# Patient Record
Sex: FEMALE | Race: WHITE | NOT HISPANIC OR LATINO
[De-identification: names, ages, dates, MRNs, and addresses within clinical notes are randomized per-mention and may not be internally consistent; named-entity substitution may affect disease eponyms.]

---

## 2021-08-06 PROBLEM — Z00.00 ENCOUNTER FOR PREVENTIVE HEALTH EXAMINATION: Status: ACTIVE | Noted: 2021-08-06

## 2021-08-09 ENCOUNTER — APPOINTMENT (OUTPATIENT)
Dept: NEUROSURGERY | Facility: CLINIC | Age: 66
End: 2021-08-09
Payer: MEDICARE

## 2021-08-09 VITALS
WEIGHT: 158 LBS | SYSTOLIC BLOOD PRESSURE: 148 MMHG | HEIGHT: 65 IN | HEART RATE: 72 BPM | TEMPERATURE: 97.2 F | DIASTOLIC BLOOD PRESSURE: 85 MMHG | BODY MASS INDEX: 26.33 KG/M2

## 2021-08-09 PROCEDURE — 99205 OFFICE O/P NEW HI 60 MIN: CPT

## 2021-08-09 NOTE — HISTORY OF PRESENT ILLNESS
[de-identified] : 64 yo F with pmhx of Aortic stenosis, HLD, HTN, Depression, on baby ASA, referred by self, presents to neurosurgery clinic due to long standing back pain. She had this back pain for many years and has tried physical therapy and pain management with ESIs in the past with no relief of pain.  She had a recent MRI L spine performed on 8/5/21 which showed multilevel multifactorial central canal stenosis most severe at L4-5 followed by L5-S1 and L3-4. She describes the back pain as constant throughout the day and is mostly axial in nature however it intermittently radiates down left buttox to left lateral mid thigh. She denies numbness, weakness of extremities and urinary/fecal incontinence. She presents to our clinic for neurosurgical consultation in relieving long standing back pain. \par \par PSH: 2 knee replacements\par Allergies: NKDA\par FHx: Heart disease in mother's side. Paternal: grandmother had BA\par SH: Works part time. Lives with family and currently caring for daughter with disability. Denies alcohol, recreational drug use, and tobacco use.\par Meds: Carvedilol, Telmisartan, Duloxetine, Simvastatin, Trazadone, Famotidine, Meloxicam, ASA

## 2021-08-09 NOTE — END OF VISIT
[FreeTextEntry3] : I have seen the patient and reviewed the case together with PA and I agree with the final recommendations and plan of care.\par \par Sanjeev Keane MD\par Neurosurgery\par \par  [Time Spent: ___ minutes] : I have spent [unfilled] minutes of time on the encounter. [>50% of the face to face encounter time was spent on counseling and/or coordination of care for ___] : Greater than 50% of the face to face encounter time was spent on counseling and/or coordination of care for [unfilled]

## 2021-08-09 NOTE — DATA REVIEWED
[de-identified] : 8/5/2021 MRI L Spine: TECHNIQUE:MRI lumbosacral spine 1.5 Isabel magnet \par \par  FINDINGS: \par \par  The paraspinal musculature including the psoas muscle, multifidus muscles, and \par  immediate para axial soft tissues appear within range of normal without \par  evidence of mass or collection. \par \par  There is a maintenance of the normal lumbar lordosis. There is fairly uniform \par  marrow signal on all sequences. \par \par  The spinal canal is patent without detectable intradural or extradural mass or \par  collection. \par \par  At L5-S1 \par  There is loss of intervertebral disc space height. Multifactorial severe \par  central canal stenosis is present probably secondary to bony overgrowth from \par  adjacent facet arthropathy with there is severe degenerative change and partly \par  secondary to broad-based disc bulging. There is moderately severe left \par  foraminal stenosis and mild to moderate right foraminal stenosis. \par \par  At L4-L5 \par  Anterior subluxation of L4 with respect to L5 measures 3 to 4 mm. There is \par  circumferential disc bulging partially secondary to uncovering of the \par  intervertebral disc which contributes to multifactorial severe central canal \par  stenosis. There is pronounced bony overgrowth associated with facet \par  arthropathy. Mild to moderate right foraminal stenosis and mild left foraminal \par  stenosis is present. \par \par  At L3-L4 \par  There is circumferential disc bulging and bony overgrowth associated with \par  bilateral facet arthropathy which results in moderate to severe central canal \par  stenosis. There is mild bilateral foraminal narrowing. \par \par  At L2-L3 \par  Subtle retrolisthesis of L2 with respect to L3 on the order of 1 to 2 mm is \par  present. There is broad-based disc osteophyte complex which results in \par  moderately severe central canal stenosis. \par \par  At L1-L2 \par  The disc appears intact. There is no significant central or foraminal \par  stenosis. \par \par \par \par  IMPRESSION: \par \par  Multilevel multifactorial central canal stenosis, most severe at L4-L5 \par  followed by L5-S1 and L3-L4. \par \par  Other pertinent findings described above

## 2021-08-09 NOTE — ASSESSMENT
[FreeTextEntry1] : I have discussed the natural history and treatment options for neurogenic claudication due to lumbar spinal stenosis with the patient. I explained the indications for observation, conservative management, medical management, physical therapy, pain management approaches and surgery. I explained the different types and surgical approaches including anterior and posterior approaches as well as decompression only procedures and instrumented fusions. I discussed the risks, benefits, possible complications and expected outcome related to each treatment option. The risks of surgery were discussed in detail including but not limited to postoperative infection at the surgical site, hospital acquired pneumonia, hospital acquired urinary tract infection, postoperative meningitis, wound dehiscence, CSF leak, stroke (ischemic and hemorrhagic), postoperative seizures, worsening motor function due to spinal cord or nerve injury, postoperative visual deficit which could be permanent (blindness) when surgery is performed in a prone position, cardiovascular complications (MI, PE, DVT) and I also explained that some of these complications could lead to sepsis, coma or even death.\par \par In the end, I recommend that she is a surgical candidate. I recommend she undergo a L3-4 laminectomy with or without fusion. To assess if would need fusion, she would need to get flex/ex L spine as well as CT Lumbar spine. Patient is in agreement with surgery and preop paperwork was provided to her. Due to her history of aortic stenosis, she would need not only a medical clearance from PCP but also a cardiology clearance. She will follow up with us in clinic in two weeks. \par \par The patient understands the plan of care and is in agreement.  All questions answered to patient satisfaction.\par \par \par

## 2021-08-09 NOTE — PHYSICAL EXAM
[General Appearance - Alert] : alert [General Appearance - In No Acute Distress] : in no acute distress [Oriented To Time, Place, And Person] : oriented to person, place, and time [Impaired Insight] : insight and judgment were intact [Affect] : the affect was normal [Person] : oriented to person [Place] : oriented to place [Time] : oriented to time [Short Term Intact] : short term memory intact [Remote Intact] : remote memory intact [Span Intact] : the attention span was normal [Concentration Intact] : normal concentrating ability [Fluency] : fluency intact [Comprehension] : comprehension intact [Current Events] : adequate knowledge of current events [Past History] : adequate knowledge of personal past history [Vocabulary] : adequate range of vocabulary [Cranial Nerves Optic (II)] : visual acuity intact bilaterally,  pupils equal round and reactive to light [Cranial Nerves Oculomotor (III)] : extraocular motion intact [Cranial Nerves Trigeminal (V)] : facial sensation intact symmetrically [Cranial Nerves Facial (VII)] : face symmetrical [Cranial Nerves Vestibulocochlear (VIII)] : hearing was intact bilaterally [Cranial Nerves Glossopharyngeal (IX)] : tongue and palate midline [Cranial Nerves Accessory (XI - Cranial And Spinal)] : head turning and shoulder shrug symmetric [Cranial Nerves Hypoglossal (XII)] : there was no tongue deviation with protrusion [Motor Tone] : muscle tone was normal in all four extremities [Motor Strength] : muscle strength was normal in all four extremities [No Muscle Atrophy] : normal bulk in all four extremities [Sensation Tactile Decrease] : light touch was intact [Balance] : balance was intact [2+] : Patella left 2+ [Past-pointing] : there was no past-pointing [Tremor] : no tremor present [FreeTextEntry8] : steady however limps while walking

## 2021-08-23 ENCOUNTER — APPOINTMENT (OUTPATIENT)
Dept: NEUROSURGERY | Facility: CLINIC | Age: 66
End: 2021-08-23
Payer: MEDICARE

## 2021-08-23 VITALS
TEMPERATURE: 98.6 F | DIASTOLIC BLOOD PRESSURE: 94 MMHG | HEART RATE: 71 BPM | HEIGHT: 65 IN | SYSTOLIC BLOOD PRESSURE: 163 MMHG | WEIGHT: 158 LBS | BODY MASS INDEX: 26.33 KG/M2

## 2021-08-23 PROCEDURE — 99215 OFFICE O/P EST HI 40 MIN: CPT

## 2021-08-23 NOTE — REASON FOR VISIT
[FreeTextEntry1] : 8/23/2021: Mr. Richardson presents today for follow up of Flex/ext Lumbar Xray and CT L spine from previous visit ~2 weeks ago regarding her long standing back pain. We recommended last time that she is a surgical candidate and would need further imaging for surgical planning. She had her Flex/ext L spine xray performed which showed no instability. She also had her CT L spine with full report listed below. She had questions regarding surgical approach and recovery period. \par \par 8/09/2021: 64 yo F with pmhx of Aortic stenosis, HLD, HTN, Depression, on baby ASA, referred by self, presents to neurosurgery clinic due to long standing back pain. She had this back pain for many years and has tried physical therapy and pain management with ESIs in the past with no relief of pain. She had a recent MRI L spine performed on 8/5/21 which showed multilevel multifactorial central canal stenosis most severe at L4-5 followed by L5-S1 and L3-4. She describes the back pain as constant throughout the day and is mostly axial in nature however it intermittently radiates down left buttox to left lateral mid thigh. She denies numbness, weakness of extremities and urinary/fecal incontinence. She presents to our clinic for neurosurgical consultation in relieving long standing back pain.

## 2021-08-23 NOTE — PHYSICAL EXAM
[FreeTextEntry1] : Constitutional: alert and in no acute distress. \par Psychiatric: oriented to person, place, and time, insight and judgment were intact and the affect was normal. \par \par Orientation: oriented to person, oriented to place and oriented to time. \par Memory: short term memory intact and remote memory intact. \par Attention: the attention span was normal and normal concentrating ability. \par Language: fluency intact and comprehension intact. \par Fund of knowledge: displays adequate knowledge of current events, adequate knowledge of personal past history and adequate range of vocabulary. \par Cranial Nerves: visual acuity intact bilaterally, pupils equal round and reactive to light, extraocular motion intact, facial sensation intact symmetrically, face symmetrical, hearing was intact bilaterally, tongue and palate midline, head turning and shoulder shrug symmetric and there was no tongue deviation with protrusion. \par Motor: muscle tone was normal in all four extremities, muscle strength was normal in all four extremities and normal bulk in all four extremities. \par Sensory exam: light touch was intact. \par Coordination:. balance was intact. there was no past-pointing. no tremor present. steady however limps while walking. \par Deep tendon reflexes: \par Biceps right 2+. Biceps left 2+.  \par Triceps right 2+. Triceps left 2+.  \par Patella right 2+. Patella left 2+.  \par

## 2021-08-23 NOTE — DATA REVIEWED
[de-identified] : CT L spine 8/2021: Multilevel spondylitic changes of the lumbar spine as detailed above notable for moderate to severe\par canal stenosis at L2-L3, L3-L4 and L4-L5 L5-S1, moderate to severe bilateral foraminal stenosis at \par L2-L3, L4-L5, L5-S1 involving the exiting L2, L4, fibrous, respectively and moderate to severe \par bilateral lateral recess stenosis at L2-L3, L4-L5, L5-S1, involving the descending L3, L5, S1 nerve \par roots, respectively. \par \par \par  Overall no significant change in degree of spondylitic disease when compared with August 5, 2021 \par lumbar spine MRI. \par

## 2021-08-23 NOTE — ASSESSMENT
[FreeTextEntry1] : Spinal stenosis of lumbar region with neurogenic claudication (724.03) (M48.062)\par Spondylolisthesis at L4-L5 level (756.12) (M43.16)\par \par I have discussed the natural history and treatment options for lumbar spondylolisthesis and neurogenic claudication due to lumbar spinal stenosis with the patient. I explained the indications for observation, conservative management, medical management, physical therapy, pain management approaches and surgery. I explained the different types and surgical approaches including anterior and posterior approaches as well as decompression only procedures and instrumented fusions. I discussed the risks, benefits, possible complications and expected outcome related to each treatment option. The risks of surgery were discussed in detail including but not limited to postoperative infection at the surgical site, hospital acquired pneumonia, hospital acquired urinary tract infection, postoperative meningitis, wound dehiscence, CSF leak, stroke (ischemic and hemorrhagic), postoperative seizures, worsening motor function due to spinal cord or nerve injury, postoperative visual deficit which could be permanent (blindness) when surgery is performed in a prone position, cardiovascular complications (MI, PE, DVT) and I also explained that some of these complications could lead to sepsis, coma or even death.\par \par In the end, I recommend that she is a surgical candidate and likely will book her case for this fall. I recommend she undergo a L4-5 decompression and fusion. In addition, I will also present her case at the [facility] spine conference.\par Preop paperwork was provided to patient. \par \par The patient understands the plan of care and is in agreement. All questions answered to patient satisfaction.\par

## 2021-10-07 ENCOUNTER — APPOINTMENT (OUTPATIENT)
Dept: NEUROSURGERY | Facility: HOSPITAL | Age: 66
End: 2021-10-07

## 2021-11-06 ENCOUNTER — NON-APPOINTMENT (OUTPATIENT)
Age: 66
End: 2021-11-06

## 2021-11-15 ENCOUNTER — RESULT REVIEW (OUTPATIENT)
Age: 66
End: 2021-11-15

## 2021-11-15 ENCOUNTER — APPOINTMENT (OUTPATIENT)
Dept: HEMATOLOGY ONCOLOGY | Facility: CLINIC | Age: 66
End: 2021-11-15
Payer: MEDICARE

## 2021-11-15 ENCOUNTER — NON-APPOINTMENT (OUTPATIENT)
Age: 66
End: 2021-11-15

## 2021-11-15 VITALS
TEMPERATURE: 98.6 F | OXYGEN SATURATION: 97 % | WEIGHT: 158 LBS | HEIGHT: 65 IN | HEART RATE: 73 BPM | DIASTOLIC BLOOD PRESSURE: 94 MMHG | SYSTOLIC BLOOD PRESSURE: 163 MMHG | BODY MASS INDEX: 26.33 KG/M2 | RESPIRATION RATE: 16 BRPM

## 2021-11-15 DIAGNOSIS — Z86.79 PERSONAL HISTORY OF OTHER DISEASES OF THE CIRCULATORY SYSTEM: ICD-10-CM

## 2021-11-15 DIAGNOSIS — Z78.9 OTHER SPECIFIED HEALTH STATUS: ICD-10-CM

## 2021-11-15 DIAGNOSIS — Z87.891 PERSONAL HISTORY OF NICOTINE DEPENDENCE: ICD-10-CM

## 2021-11-15 DIAGNOSIS — G43.109 MIGRAINE WITH AURA, NOT INTRACTABLE, W/OUT STATUS MIGRAINOSUS: ICD-10-CM

## 2021-11-15 DIAGNOSIS — Z80.6 FAMILY HISTORY OF LEUKEMIA: ICD-10-CM

## 2021-11-15 DIAGNOSIS — Z80.0 FAMILY HISTORY OF MALIGNANT NEOPLASM OF DIGESTIVE ORGANS: ICD-10-CM

## 2021-11-15 DIAGNOSIS — Z80.1 FAMILY HISTORY OF MALIGNANT NEOPLASM OF TRACHEA, BRONCHUS AND LUNG: ICD-10-CM

## 2021-11-15 DIAGNOSIS — Z80.3 FAMILY HISTORY OF MALIGNANT NEOPLASM OF BREAST: ICD-10-CM

## 2021-11-15 DIAGNOSIS — Z86.59 PERSONAL HISTORY OF OTHER MENTAL AND BEHAVIORAL DISORDERS: ICD-10-CM

## 2021-11-15 PROCEDURE — 36415 COLL VENOUS BLD VENIPUNCTURE: CPT

## 2021-11-15 PROCEDURE — 99205 OFFICE O/P NEW HI 60 MIN: CPT | Mod: 25

## 2021-11-15 RX ORDER — ATORVASTATIN CALCIUM 10 MG/1
10 TABLET, FILM COATED ORAL
Refills: 0 | Status: ACTIVE | COMMUNITY
Start: 2021-11-15

## 2021-11-15 RX ORDER — TRAZODONE HYDROCHLORIDE 100 MG/1
100 TABLET ORAL
Refills: 0 | Status: ACTIVE | COMMUNITY
Start: 2021-11-15

## 2021-11-15 RX ORDER — TELMISARTAN AND HYDROCHLOROTHIAZIDE 40; 12.5 MG/1; MG/1
40-12.5 TABLET ORAL
Refills: 0 | Status: ACTIVE | COMMUNITY
Start: 2021-11-15

## 2021-11-15 RX ORDER — CARVEDILOL 12.5 MG/1
12.5 TABLET, FILM COATED ORAL
Qty: 180 | Refills: 0 | Status: ACTIVE | COMMUNITY
Start: 2021-10-01

## 2021-11-15 RX ORDER — ASPIRIN 81 MG/1
81 TABLET, CHEWABLE ORAL
Refills: 0 | Status: ACTIVE | COMMUNITY
Start: 2021-11-15

## 2021-11-15 RX ORDER — DULOXETINE HYDROCHLORIDE 60 MG/1
60 CAPSULE, DELAYED RELEASE PELLETS ORAL
Refills: 0 | Status: ACTIVE | COMMUNITY
Start: 2021-11-15

## 2021-11-15 RX ORDER — CITALOPRAM 40 MG/1
40 TABLET, FILM COATED ORAL
Refills: 0 | Status: ACTIVE | COMMUNITY
Start: 2021-11-15

## 2021-11-15 RX ORDER — NAPROXEN 500 MG/1
500 TABLET ORAL
Qty: 60 | Refills: 0 | Status: ACTIVE | COMMUNITY
Start: 2021-10-19

## 2021-11-15 RX ORDER — FAMOTIDINE 40 MG/1
40 TABLET, FILM COATED ORAL
Refills: 0 | Status: ACTIVE | COMMUNITY
Start: 2021-11-15

## 2021-11-15 RX ORDER — SIMVASTATIN 10 MG/1
10 TABLET, FILM COATED ORAL
Qty: 90 | Refills: 0 | Status: COMPLETED | COMMUNITY
Start: 2021-04-15

## 2021-11-15 NOTE — REVIEW OF SYSTEMS
[Negative] : Allergic/Immunologic [Fatigue] : fatigue [Anxiety] : anxiety [FreeTextEntry9] : back pain

## 2021-11-15 NOTE — HISTORY OF PRESENT ILLNESS
[de-identified] : Ms. Richardson is a 66 year old woman who presents for initial consultation of prolonged PTT.\par Most recent blood work 2021, 37.1 and 37.8\par She was seen by Dr. Dunaway for medical clearance prior to a L4-L5 laminectomy with L4-L5 fusion due to lumbar spinal stenosis by Dr. Keane\par Patient states that she has been taking aspirin 81mg daily, PO due to aortic stenosis and is on naproxen due to inflammation of her knees. In 2018 she had a knee replacement, 2019, she had a 2nd one.  No issues bleeding during previous surgeries\par Doesn't bleed or bruise.\par \par Age of Menarche: 10 years old, heavy menses at times 5-6 days\par Age of Menopause: 50\par OCP: none\par HRT: none\par \par \par Social: Former smoker\par Drinker: social drinker: rarely\par Illicit Drugs:None

## 2021-11-15 NOTE — ASSESSMENT
[FreeTextEntry1] : #prolonged PTT\par We have reviewed the causes of prolonged PTT to include factor deficiencies of  VIII, IX, or XI, XII, prekallikrein, or HMW kininogen. Deficiencies of XII, prekallikrein or HMW kininogen is not associated with a bleeding diathesis. Anticoagulants such as Heparin, dabigatran, argatroban, direct factor Xa inhibitors can cause prolongation although the patient is not on any blood thinners. Acquired inhibitor of factor VIII, IX, XI, or XII  can also cause prolongation. Lupus anticoagulant can lead to prolongation of PTT however it is more likely to be associated with thrombosis than bleeding.\par \par #back pain - scheduled for a L4-5 decompression and fusion with Dr. Keane\par once work up is back will discuss if she is a bleeding risk and what interventions may be needed\par \par # aortic stenosis on aspirin 81mg daily\par advised to contact cardio/pcp however usually ASA is stopped 7 days prior to surgery\par \par #HTN - continue telmisartan/coreg\par \par #fatigue \par check iron, B12, TSH, Vit D\par \par \par RTC in 10 days to review bloodwork

## 2021-11-15 NOTE — CONSULT LETTER
[Dear  ___] : Dear  [unfilled], [Consult Letter:] : I had the pleasure of evaluating your patient, [unfilled]. [Please see my note below.] : Please see my note below. [Consult Closing:] : Thank you very much for allowing me to participate in the care of this patient.  If you have any questions, please do not hesitate to contact me. [Sincerely,] : Sincerely, [DrSuki  ___] : Dr. JEAN [FreeTextEntry3] : Deepali Stock DO, FACLEI, FACP\par Medical Oncology and Hematology\par [facility] [place]\par

## 2021-11-16 ENCOUNTER — NON-APPOINTMENT (OUTPATIENT)
Age: 66
End: 2021-11-16

## 2021-11-18 ENCOUNTER — APPOINTMENT (OUTPATIENT)
Dept: NEUROSURGERY | Facility: HOSPITAL | Age: 66
End: 2021-11-18

## 2021-11-29 ENCOUNTER — RESULT REVIEW (OUTPATIENT)
Age: 66
End: 2021-11-29

## 2021-11-29 ENCOUNTER — APPOINTMENT (OUTPATIENT)
Dept: HEMATOLOGY ONCOLOGY | Facility: CLINIC | Age: 66
End: 2021-11-29
Payer: MEDICARE

## 2021-11-29 VITALS
RESPIRATION RATE: 18 BRPM | TEMPERATURE: 97.6 F | HEART RATE: 76 BPM | BODY MASS INDEX: 26.73 KG/M2 | WEIGHT: 160.4 LBS | SYSTOLIC BLOOD PRESSURE: 145 MMHG | OXYGEN SATURATION: 97 % | HEIGHT: 65 IN | DIASTOLIC BLOOD PRESSURE: 78 MMHG

## 2021-11-29 DIAGNOSIS — R73.9 HYPERGLYCEMIA, UNSPECIFIED: ICD-10-CM

## 2021-11-29 DIAGNOSIS — I35.0 NONRHEUMATIC AORTIC (VALVE) STENOSIS: ICD-10-CM

## 2021-11-29 PROCEDURE — 36415 COLL VENOUS BLD VENIPUNCTURE: CPT

## 2021-11-29 PROCEDURE — 99215 OFFICE O/P EST HI 40 MIN: CPT | Mod: 25

## 2021-11-29 RX ORDER — CHLORHEXIDINE GLUCONATE 4 %
325 (65 FE) LIQUID (ML) TOPICAL 3 TIMES DAILY
Qty: 90 | Refills: 5 | Status: ACTIVE | COMMUNITY
Start: 2021-11-29 | End: 1900-01-01

## 2021-11-29 RX ORDER — ACETAMINOPHEN/DIPHENHYDRAMINE 500MG-25MG
1000 TABLET ORAL
Qty: 30 | Refills: 2 | Status: ACTIVE | COMMUNITY
Start: 2021-11-29 | End: 1900-01-01

## 2021-11-29 NOTE — CONSULT LETTER
[Dear  ___] : Dear  [unfilled], [Consult Letter:] : I had the pleasure of evaluating your patient, [unfilled]. [Please see my note below.] : Please see my note below. [Consult Closing:] : Thank you very much for allowing me to participate in the care of this patient.  If you have any questions, please do not hesitate to contact me. [Sincerely,] : Sincerely, [DrSuki  ___] : Dr. EJAN [FreeTextEntry3] : Deepali Stock DO, FACLEI, FACP\par Medical Oncology and Hematology\par [facility] [place]\par

## 2021-11-29 NOTE — ASSESSMENT
[FreeTextEntry1] : #prolonged PTT\par repeat PTT wnl\par mixing study wnl\par Factor IX, XI - slightly low does not need factor replacement\par LA wnl\par Factor VII, X, XII, XIII wnl\par \par #back pain - scheduled for a L4-5 decompression and fusion with Dr. Keane\par cleared for surgery from a hematology standpt\par \par # aortic stenosis on aspirin 81mg daily\par advised to contact cardio/pcp however usually ASA is stopped 7 days prior to surgery\par \par #HTN - continue telmisartan/coreg\par \par #fatigue \par ferritin 36 - recommend po iron 325 mg daily\par vit D wnl\par b12 low normal - can give viit B12 1000 mcg\par \par RTC in 3 months with cbc with diff, cmp, iron, ferritin, B12

## 2021-11-29 NOTE — HISTORY OF PRESENT ILLNESS
[de-identified] : Ms. Richardson is a 66 year old woman who presents for initial consultation of prolonged PTT.\par Most recent blood work 2021, 37.1 and 37.8\par She was seen by Dr. Dunaway for medical clearance prior to a L4-L5 laminectomy with L4-L5 fusion due to lumbar spinal stenosis by Dr. Keane\par Patient states that she has been taking aspirin 81mg daily, PO due to aortic stenosis and is on naproxen due to inflammation of her knees. In 2018 she had a knee replacement, 2019, she had a 2nd one.  No issues bleeding during previous surgeries\par Doesn't bleed or bruise.\par \par Age of Menarche: 10 years old, heavy menses at times 5-6 days\par Age of Menopause: 50\par OCP: none\par HRT: none\par \par \par Social: Former smoker\par Drinker: social drinker: rarely\par Illicit Drugs:None  [de-identified] : Patient seen and examined and here today for follow up\par  had heart attack on Thanksgiving \par Still feels tired

## 2021-11-29 NOTE — REVIEW OF SYSTEMS
[Fatigue] : fatigue [Anxiety] : anxiety [Negative] : Allergic/Immunologic [FreeTextEntry9] : back pain

## 2022-02-28 ENCOUNTER — RESULT REVIEW (OUTPATIENT)
Age: 67
End: 2022-02-28

## 2022-02-28 ENCOUNTER — APPOINTMENT (OUTPATIENT)
Dept: HEMATOLOGY ONCOLOGY | Facility: CLINIC | Age: 67
End: 2022-02-28
Payer: MEDICARE

## 2022-02-28 VITALS
TEMPERATURE: 99 F | HEIGHT: 65 IN | RESPIRATION RATE: 18 BRPM | WEIGHT: 164 LBS | SYSTOLIC BLOOD PRESSURE: 153 MMHG | BODY MASS INDEX: 27.32 KG/M2 | OXYGEN SATURATION: 96 % | DIASTOLIC BLOOD PRESSURE: 90 MMHG | HEART RATE: 70 BPM

## 2022-02-28 DIAGNOSIS — R53.83 OTHER FATIGUE: ICD-10-CM

## 2022-02-28 PROCEDURE — 36415 COLL VENOUS BLD VENIPUNCTURE: CPT

## 2022-02-28 PROCEDURE — 99214 OFFICE O/P EST MOD 30 MIN: CPT | Mod: 25

## 2022-02-28 NOTE — CONSULT LETTER
[Dear  ___] : Dear  [unfilled], [Consult Letter:] : I had the pleasure of evaluating your patient, [unfilled]. [Please see my note below.] : Please see my note below. [Consult Closing:] : Thank you very much for allowing me to participate in the care of this patient.  If you have any questions, please do not hesitate to contact me. [Sincerely,] : Sincerely, [DrSuki  ___] : Dr. JEAN [FreeTextEntry3] : Deepali Stock DO, FACLEI, FACP\par Medical Oncology and Hematology\par [facility] [place]\par

## 2022-02-28 NOTE — ASSESSMENT
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 Keane\par will recheck PT/INR/PTT,  Factor XI and IX closer to surgery to clear\par \par # aortic stenosis on aspirin 81mg daily\par advised to contact cardio/pcp however usually ASA is stopped 7 days prior to surgery\par \par #HTN - continue telmisartan/coreg\par \par #fatigue \par ferritin 36 - recommend po iron 325 mg daily\par vit D wnl\par b12 low normal - can give viit B12 1000 mcg\par cologuard - ordered, follow up with Dr. Dunaway for results\par \par 2/28/22 - hgb increased to 12.6. follow up on iron, ferritin and b12. taking PO B12, Oral iron causing constipation, if needed will give IV iron\par \par RTC in 4 months with cbc with diff, cmp, iron, ferritin, B12,\par She will contact us prior to surgery to draw PT/INR/PTT,  Factor XI and IX.

## 2022-02-28 NOTE — HISTORY OF PRESENT ILLNESS
[de-identified] : Ms. Richardson is a 66 year old woman who presents for initial consultation of prolonged PTT.\par Most recent blood work 2021, 37.1 and 37.8\par She was seen by Dr. Dunaway for medical clearance prior to a L4-L5 laminectomy with L4-L5 fusion due to lumbar spinal stenosis by Dr. Keane\par Patient states that she has been taking aspirin 81mg daily, PO due to aortic stenosis and is on naproxen due to inflammation of her knees. In 2018 she had a knee replacement, 2019, she had a 2nd one.  No issues bleeding during previous surgeries\par Doesn't bleed or bruise.\par \par Age of Menarche: 10 years old, heavy menses at times 5-6 days\par Age of Menopause: 50\par OCP: none\par HRT: none\par \par \par Social: Former smoker\par Drinker: social drinker: rarely\par Illicit Drugs:None  [de-identified] : Patient seen and examined and here today for follow up\par complains of back pain - still hasn’t had surgery\par taking iron pills daily - having constipation

## 2022-03-03 ENCOUNTER — NON-APPOINTMENT (OUTPATIENT)
Age: 67
End: 2022-03-03

## 2022-03-16 ENCOUNTER — APPOINTMENT (OUTPATIENT)
Dept: NEUROSURGERY | Facility: CLINIC | Age: 67
End: 2022-03-16
Payer: MEDICARE

## 2022-03-16 DIAGNOSIS — Z01.810 ENCOUNTER FOR PREPROCEDURAL CARDIOVASCULAR EXAMINATION: ICD-10-CM

## 2022-03-16 PROCEDURE — 99215 OFFICE O/P EST HI 40 MIN: CPT

## 2022-03-16 RX ORDER — CYCLOBENZAPRINE HYDROCHLORIDE 5 MG/1
5 TABLET, FILM COATED ORAL
Qty: 30 | Refills: 2 | Status: ACTIVE | COMMUNITY
Start: 2022-03-16 | End: 1900-01-01

## 2022-03-16 NOTE — ASSESSMENT
[FreeTextEntry1] : I have discussed the natural history and treatment options for lumbar spondylolisthesis and neurogenic claudication due to lumbar spinal stenosis with the patient. I explained the indications for observation, conservative management, medical management, physical therapy, pain management approaches and surgery. I explained the different types and surgical approaches including anterior and posterior approaches as well as decompression only procedures and instrumented fusions. I discussed the risks, benefits, possible complications and expected outcome related to each treatment option. The risks of surgery were discussed in detail including but not limited to postoperative infection at the surgical site, hospital acquired pneumonia, hospital acquired urinary tract infection, postoperative meningitis, wound dehiscence, CSF leak, stroke (ischemic and hemorrhagic), postoperative seizures, worsening motor function due to spinal cord or nerve injury, postoperative visual deficit which could be permanent (blindness) when surgery is performed in a prone position, cardiovascular complications (MI, PE, DVT) and I also explained that some of these complications could lead to sepsis, coma or even death.\par \par Her hematologic condition appears to be well-managed, and therefore we will aim to reschedule her surgical procedure, which include L4-5 decompression and fusion.  She will require hematology clearance and perioperative recommendations prior to undergoing surgery.  We will coordinate a date and time in the near future for her procedure.\par \par The patient understands the plan of care and is in agreement. All questions answered to patient satisfaction.\par \par \par

## 2022-03-16 NOTE — DATA REVIEWED
[de-identified] : \par Exam: CT LUMBAR SPINE \par Order#: [id number] \par \par \par \par EXAM: CT lumbar spine without contrast \par \par  INDICATION: Lumbar back pain \par \par  TECHNIQUE: CT examination lumbar spine performed without contrast. Multiple axial images obtained \par from the thoracolumbar junction through the sacrum. Sagittal/coronal reformatted images were \par acquired. Images reviewed in soft tissue and bone windows. \par \par  COMPARISON: August 5, 2021 MR lumbar spine \par \par  FINDINGS: \par \par \par  For the purposes of this report, 5 nonrib-bearing lumbar-type vertebral bodies are present in the \par inferior most well-formed intervertebral disc space will be designated as L5-S1. Vertebral body \par denoted as L5 is at the level of the iliolumbar ligament. No CT evidence for transitional \par lumbosacral anatomy. \par \par  Lumbar lordosis is maintained. Moderate levocurvature centered at the mid lumbar spine. Mild L5-S1,\par L4-L5 anterolisthesis, L3-L4 retrolisthesis, L2-L3 retrolisthesis Lumbar vertebral body heights are \par maintained. Moderate multilevel degenerative endplate changes with mild endplate sclerosis, \par multilevel Schmorl's nodes, intervertebral disc space narrowing. No evidence of spondylolysis. \par \par  Multilevel findings as follows: \par \par \par \par  T11-12: No significant osseous canal or foraminal stenosis. \par \par  T12-L1: No significant osseous canal or foraminal stenosis. \par \par  L1-L2: No significant osseous canal or foraminal stenosis. \par \par  L2-L3: Disc bulge, bilateral facet arthropathy, ligamentous hypertrophy contributing to moderate to\par severe canal stenosis and moderate to severe bilateral foraminal stenosis.. Moderate bilateral \par lateral recess stenosis with involvement of descending L3 nerve roots. \par \par  L3-L4: Disc bulge, bilateral facet arthropathy, ligamentous hypertrophy contributing to moderate to\par severe canal stenosis and mild bilateral foraminal stenosis. Mild bilateral lateral recess stenosis \par with involvement of descending L4 nerve roots. \par \par  L4-L5: Disc bulge, bilateral facet arthropathy, ligamentous hypertrophy contributing to severe \par canal stenosis and mild to moderate bilateral foraminal stenosis. Moderate to severe bilateral \par lateral recess stenosis with involvement of descending L5 nerve roots. \par \par  L5-S1: Disc bulge, bilateral facet arthropathy, ligamentous hypertrophy contributing to mild to \par moderate canal stenosis and moderate bilateral foraminal stenosis. Moderate bilateral lateral recess\par stenosis with involvement of the descending S1 nerve roots. \par \par  Overall no significant change in degree of spondylitic disease when compared with August 5, 2021 \par lumbar spine MRI. \par \par \par \par \par  IMPRESSION: \par \par  Multilevel spondylitic changes of the lumbar spine as detailed above notable for moderate to severe\par canal stenosis at L2-L3, L3-L4 and L4-L5 L5-S1, moderate to severe bilateral foraminal stenosis at \par L2-L3, L4-L5, L5-S1 involving the exiting L2, L4, fibrous, respectively and moderate to severe \par bilateral lateral recess stenosis at L2-L3, L4-L5, L5-S1, involving the descending L3, L5, S1 nerve \par roots, respectively. \par \par \par  Overall no significant change in degree of spondylitic disease when compared with August 5, 2021 \par lumbar spine MRI. \par \par  --- End of Report --- \par \par ***Electronically Signed *** \par ----------------------------------------------- \par Efraín Mary MD 08/13/21 1611 \par \par Dictated on 08/13/21  [de-identified] : \par \par Report date: 8/5/2021 \par  \par  View Order\par \par \par (Report matches study selected on Patient History pane)\par \par \par   \par \par \par PROCEDURE: MRI LUMBAR SPINE \par \par ORDER #: [id number] \par CC: ; ; ; \par End of cc's \par \par HISTORY: 65 years Female M48.061, SPINAL STENOSIS, LUMBAR REGION W/O \par  NEUROGENIC CLAUDICATING YM \par \par \par  PROCEDURE:MRI lumbar spine without contrast \par \par  COMPARISON:None \par \par  TECHNIQUE:MRI lumbosacral spine 1.5 Isabel magnet \par \par  FINDINGS: \par \par  The paraspinal musculature including the psoas muscle, multifidus muscles, and \par  immediate para axial soft tissues appear within range of normal without \par  evidence of mass or collection. \par \par  There is a maintenance of the normal lumbar lordosis. There is fairly uniform \par  marrow signal on all sequences. \par \par  The spinal canal is patent without detectable intradural or extradural mass or \par  collection. \par \par  At L5-S1 \par  There is loss of intervertebral disc space height. Multifactorial severe \par  central canal stenosis is present probably secondary to bony overgrowth from \par  adjacent facet arthropathy with there is severe degenerative change and partly \par  secondary to broad-based disc bulging. There is moderately severe left \par  foraminal stenosis and mild to moderate right foraminal stenosis. \par \par  At L4-L5 \par  Anterior subluxation of L4 with respect to L5 measures 3 to 4 mm. There is \par  circumferential disc bulging partially secondary to uncovering of the \par  intervertebral disc which contributes to multifactorial severe central canal \par  stenosis. There is pronounced bony overgrowth associated with facet \par  arthropathy. Mild to moderate right foraminal stenosis and mild left foraminal \par  stenosis is present. \par \par  At L3-L4 \par  There is circumferential disc bulging and bony overgrowth associated with \par  bilateral facet arthropathy which results in moderate to severe central canal \par  stenosis. There is mild bilateral foraminal narrowing. \par \par  At L2-L3 \par  Subtle retrolisthesis of L2 with respect to L3 on the order of 1 to 2 mm is \par  present. There is broad-based disc osteophyte complex which results in \par  moderately severe central canal stenosis. \par \par  At L1-L2 \par  The disc appears intact. There is no significant central or foraminal \par  stenosis. \par \par \par \par  IMPRESSION: \par \par  Multilevel multifactorial central canal stenosis, most severe at L4-L5 \par  followed by L5-S1 and L3-L4. \par \par  Other pertinent findings described above \par \par  --- End of Report --- \par \par  Electronically Signed: \par  ____________________________________________ \par  Garrett Tai MD  [de-identified] : Flexion/Extension: No instability

## 2022-03-16 NOTE — PHYSICAL EXAM
[FreeTextEntry1] : \par Constitutional: alert and in no acute distress. \par Psychiatric: oriented to person, place, and time, insight and judgment were intact and the affect was normal. \par \par Orientation: oriented to person, oriented to place and oriented to time. \par Memory: short term memory intact and remote memory intact. \par Attention: the attention span was normal and normal concentrating ability. \par Language: fluency intact and comprehension intact. \par Fund of knowledge: displays adequate knowledge of current events, adequate knowledge of personal past history and adequate range of vocabulary. \par Cranial Nerves: visual acuity intact bilaterally, pupils equal round and reactive to light, extraocular motion intact, facial sensation intact symmetrically, face symmetrical, hearing was intact bilaterally, tongue and palate midline, head turning and shoulder shrug symmetric and there was no tongue deviation with protrusion. \par Motor: muscle tone was normal in all four extremities, muscle strength was normal in all four extremities and normal bulk in all four extremities. \par Sensory exam: light touch was intact. \par Coordination:. balance was intact. there was no past-pointing. no tremor present. steady however limps while walking. \par Deep tendon reflexes: \par Biceps right 2+. Biceps left 2+. \par Triceps right 2+. Triceps left 2+. \par Patella right 2+. Patella left 2+. \par

## 2022-03-16 NOTE — HISTORY OF PRESENT ILLNESS
[FreeTextEntry1] : Ms. Richardson presents today for neurosurgical follow up.  She is a 66 year-old female that was initially set to undergo L4-5 decompression and fusion secondary to intractable low back pain secondary to spondylolisthesis.  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 Stock.  \par \par Today, she returns for preop surgical discussion. Her back pain with left gluteal radicular pain persists.  She denies numbness/tingling, weakness or bowel/bladder dysfunction.  \par \par 8/23/2021: Mr. Richardson presents today for follow up of Flex/ext Lumbar Xray and CT L spine from previous visit ~2 weeks ago regarding her long standing back pain. We recommended last time that she is a surgical candidate and would need further imaging for surgical planning. She had her Flex/ext L spine xray performed which showed no instability. She also had her CT L spine with full report listed below. \par \par 8/09/2021: 64 yo F with pmhx of Aortic stenosis, HLD, HTN, Depression, on baby ASA, referred by self, presents to neurosurgery clinic due to long standing back pain. She had this back pain for many years and has tried physical therapy and pain management with ESIs in the past with no relief of pain. She had a recent MRI L spine performed on 8/5/21 which showed multilevel multifactorial central canal stenosis most severe at L4-5 followed by L5-S1 and L3-4. She describes the back pain as constant throughout the day and is mostly axial in nature however it intermittently radiates down left buttocks to left lateral mid thigh. She denies numbness, weakness of extremities and urinary/fecal incontinence.

## 2022-04-27 DIAGNOSIS — E53.8 DEFICIENCY OF OTHER SPECIFIED B GROUP VITAMINS: ICD-10-CM

## 2022-05-02 ENCOUNTER — RESULT REVIEW (OUTPATIENT)
Age: 67
End: 2022-05-02

## 2022-05-02 ENCOUNTER — APPOINTMENT (OUTPATIENT)
Dept: HEMATOLOGY ONCOLOGY | Facility: CLINIC | Age: 67
End: 2022-05-02
Payer: MEDICARE

## 2022-05-02 VITALS
BODY MASS INDEX: 26.54 KG/M2 | DIASTOLIC BLOOD PRESSURE: 78 MMHG | TEMPERATURE: 97.9 F | HEART RATE: 70 BPM | HEIGHT: 65 IN | WEIGHT: 159.31 LBS | OXYGEN SATURATION: 98 % | SYSTOLIC BLOOD PRESSURE: 121 MMHG | RESPIRATION RATE: 16 BRPM

## 2022-05-02 DIAGNOSIS — E61.1 IRON DEFICIENCY: ICD-10-CM

## 2022-05-02 PROCEDURE — 99214 OFFICE O/P EST MOD 30 MIN: CPT | Mod: 25

## 2022-05-02 PROCEDURE — 36415 COLL VENOUS BLD VENIPUNCTURE: CPT

## 2022-05-02 RX ORDER — UBIDECARENONE/VIT E ACET 100MG-5
1000 CAPSULE ORAL
Refills: 0 | Status: ACTIVE | COMMUNITY

## 2022-05-02 RX ORDER — CARVEDILOL 25 MG/1
25 TABLET, FILM COATED ORAL
Refills: 0 | Status: COMPLETED | COMMUNITY
Start: 2021-11-15 | End: 2022-05-02

## 2022-05-02 RX ORDER — MELOXICAM 15 MG/1
15 TABLET ORAL
Refills: 0 | Status: COMPLETED | COMMUNITY
Start: 2021-11-15 | End: 2022-05-02

## 2022-05-02 RX ORDER — AMLODIPINE BESYLATE 10 MG/1
10 TABLET ORAL
Refills: 0 | Status: ACTIVE | COMMUNITY

## 2022-05-02 RX ORDER — CITALOPRAM 20 MG/1
20 TABLET, FILM COATED ORAL
Refills: 0 | Status: ACTIVE | COMMUNITY

## 2022-05-02 NOTE — ASSESSMENT
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 hgb stable. pending pt/inr/ptt, factor XI and IX - once resulted will discuss presurgical needs and give clearance. She will hold ASA 7 days prior to procedure\par \par #back pain - scheduled for a L4-5 decompression and fusion with Dr. Keane\par will recheck PT/INR/PTT,  Factor XI and IX closer to surgery to clear\par 5/2/22 - reviewed Dr. Keane's note.   vs and labs reviewed. hgb stable. pending pt/inr/ptt, factor XI and IX - once resulted will discuss presurgical needs and give clearance. She will hold ASA 7 days prior to procedure\par \par # aortic stenosis on aspirin 81mg daily\par advised to contact cardio/pcp however usually ASA is stopped 7 days prior to surgery\par \par #HTN - continue telmisartan/coreg\par \par #fatigue \par ferritin 36 - recommend po iron 325 mg daily\par vit D wnl\par b12 low normal - can give viit B12 1000 mcg\par cologuard - ordered, follow up with Dr. Dunaway for results\par \par 2/28/22 - hgb increased to 12.3. pending iron, ferritin and b12. taking PO B12, Oral iron causing constipation, if needed will give IV iron\par \par Telehealth 5/11/22 -

## 2022-05-02 NOTE — HISTORY OF PRESENT ILLNESS
[de-identified] : Ms. Richardson is a 66 year old woman who presents for initial consultation of prolonged PTT.\par Most recent blood work 2021, 37.1 and 37.8\par She was seen by Dr. Dunaway for medical clearance prior to a L4-L5 laminectomy with L4-L5 fusion due to lumbar spinal stenosis by Dr. Keane\par Patient states that she has been taking aspirin 81mg daily, PO due to aortic stenosis and is on naproxen due to inflammation of her knees. In 2018 she had a knee replacement, 2019, she had a 2nd one.  No issues bleeding during previous surgeries\par Doesn't bleed or bruise.\par \par Age of Menarche: 10 years old, heavy menses at times 5-6 days\par Age of Menopause: 50\par OCP: none\par HRT: none\par \par \par Social: Former smoker\par Drinker: social drinker: rarely\par Illicit Drugs:None  [de-identified] : Patient seen and examined and here today for follow up\par complains of back pain -  L4-5 decompression and fusion scheduled for 5/19/2022\par constipation with iron\par Otherwise no complaints

## 2022-05-02 NOTE — CONSULT LETTER
[FreeTextEntry3] : Deepali Stock DO, FACLEI, FACP\par Medical Oncology and Hematology\par  [facility] [place]\par

## 2022-05-04 ENCOUNTER — RESULT REVIEW (OUTPATIENT)
Age: 67
End: 2022-05-04

## 2022-05-11 ENCOUNTER — APPOINTMENT (OUTPATIENT)
Dept: HEMATOLOGY ONCOLOGY | Facility: CLINIC | Age: 67
End: 2022-05-11
Payer: MEDICARE

## 2022-05-11 DIAGNOSIS — Z01.818 ENCOUNTER FOR OTHER PREPROCEDURAL EXAMINATION: ICD-10-CM

## 2022-05-11 DIAGNOSIS — R79.1 ABNORMAL COAGULATION PROFILE: ICD-10-CM

## 2022-05-11 DIAGNOSIS — D68.1 HEREDITARY FACTOR XI DEFICIENCY: ICD-10-CM

## 2022-05-11 DIAGNOSIS — D67 HEREDITARY FACTOR IX DEFICIENCY: ICD-10-CM

## 2022-05-11 PROCEDURE — 99214 OFFICE O/P EST MOD 30 MIN: CPT | Mod: 95

## 2022-05-11 NOTE — CONSULT LETTER
[Dear  ___] : Dear  [unfilled], [Consult Letter:] : I had the pleasure of evaluating your patient, [unfilled]. [Please see my note below.] : Please see my note below. [Consult Closing:] : Thank you very much for allowing me to participate in the care of this patient.  If you have any questions, please do not hesitate to contact me. [Sincerely,] : Sincerely, [DrSuki  ___] : Dr. JEAN [FreeTextEntry3] : Deepali Stock DO, FACLEI, FACP\par Medical Oncology and Hematology\par [facility] [place]\par

## 2022-05-11 NOTE — ASSESSMENT
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 hgb stable. pending pt/inr/ptt, factor XI and IX - once resulted will discuss presurgical needs and give clearance. She will hold ASA 7 days prior to procedure\par 5/11/22 - vs and labs reviewed. factor XI and IX wnl currently, LA  negative. From a hematologic standpoint she is optomized for surgery with Dr. Keane. Hold ASA 7 days prior\par \par #back pain - scheduled for a L4-5 decompression and fusion with Dr. Keane\par will recheck PT/INR/PTT,  Factor XI and IX closer to surgery to clear\par 5/2/22 - reviewed Dr. Keane's note.   vs and labs reviewed. hgb stable. pending pt/inr/ptt, factor XI and IX - once resulted will discuss presurgical needs and give clearance. She will hold ASA 7 days prior to procedure\par 5/11/22 - vs and labs reviewed. factor XI and IX wnl currently, LA  negative. From a hematologic standpoint she is optomized for surgery with Dr. Keane. Hold ASA 7 days prior\par \par # aortic stenosis on aspirin 81mg daily\par advised to contact cardio/pcp however usually ASA is stopped 7 days prior to surgery\par \par #HTN - continue telmisartan/coreg\par \par #fatigue \par vit D wnl\par b12 low normal - can give viit B12 1000 mcg\par cologuard - ordered, follow up with Dr. Dunaway for results\par 2/28/22 - hgb increased to 12.3. pending iron, ferritin and b12. taking PO B12, Oral iron causing constipation, if needed will give IV iron\par 5/11/22 - hgb from 5/2/22 12.6,ferritin 108. Does not need IV iron\par \par RTC PRN

## 2022-05-11 NOTE — HISTORY OF PRESENT ILLNESS
[Home] : at home, [unfilled] , at the time of the visit. [Medical Office: ([facility])___] : at the medical office located in  [Verbal consent obtained from patient] : the patient, [unfilled] [de-identified] : Ms. Richardson is a 66 year old woman who presents for initial consultation of prolonged PTT.\par Most recent blood work 2021, 37.1 and 37.8\par She was seen by Dr. Dunaway for medical clearance prior to a L4-L5 laminectomy with L4-L5 fusion due to lumbar spinal stenosis by Dr. Keane\par Patient states that she has been taking aspirin 81mg daily, PO due to aortic stenosis and is on naproxen due to inflammation of her knees. In 2018 she had a knee replacement, 2019, she had a 2nd one.  No issues bleeding during previous surgeries\par Doesn't bleed or bruise.\par \par Age of Menarche: 10 years old, heavy menses at times 5-6 days\par Age of Menopause: 50\par OCP: none\par HRT: none\par \par \par Social: Former smoker\par Drinker: social drinker: rarely\par Illicit Drugs:None  [de-identified] : Patient seen via telehealth\par feeling well. no complaints\par complains of back pain -  L4-5 decompression and fusion scheduled for 5/19/2022

## 2022-05-19 ENCOUNTER — APPOINTMENT (OUTPATIENT)
Dept: NEUROSURGERY | Facility: HOSPITAL | Age: 67
End: 2022-05-19

## 2022-06-14 ENCOUNTER — RESULT REVIEW (OUTPATIENT)
Age: 67
End: 2022-06-14

## 2022-06-21 ENCOUNTER — RESULT REVIEW (OUTPATIENT)
Age: 67
End: 2022-06-21

## 2022-06-21 ENCOUNTER — APPOINTMENT (OUTPATIENT)
Dept: NEUROSURGERY | Facility: HOSPITAL | Age: 67
End: 2022-06-21

## 2022-06-27 ENCOUNTER — TRANSCRIPTION ENCOUNTER (OUTPATIENT)
Age: 67
End: 2022-06-27

## 2022-07-13 ENCOUNTER — APPOINTMENT (OUTPATIENT)
Dept: NEUROSURGERY | Facility: CLINIC | Age: 67
End: 2022-07-13

## 2022-07-13 VITALS
OXYGEN SATURATION: 98 % | HEIGHT: 65 IN | DIASTOLIC BLOOD PRESSURE: 80 MMHG | WEIGHT: 160 LBS | SYSTOLIC BLOOD PRESSURE: 134 MMHG | BODY MASS INDEX: 26.66 KG/M2 | HEART RATE: 70 BPM | TEMPERATURE: 98.7 F

## 2022-07-13 PROCEDURE — 99024 POSTOP FOLLOW-UP VISIT: CPT

## 2022-07-13 NOTE — PHYSICAL EXAM
[Clean] : clean [Dry] : dry [Healing Well] : healing well [Intact] : intact [Normal Skin] : normal [Fluctuant] : not fluctuant [FreeTextEntry1] : lumbar spine

## 2022-07-13 NOTE — ASSESSMENT
[FreeTextEntry1] : Ms. Richardson is doing well 3 weeks s/p L4-5 laminectomy and fusion for intractable back pain due to spondylolisthesis. Her incision is healing well and her back pain is improving. She should begin outpatient physical therapy, a referral was provided. She should not lift greater than 10 lbs. She should follow up with us at 3 months post-op with AP/Lat X-ray lumbar spine to evaluate hardware prior to appointment. The patient understands the plan of care and is in agreement.  All questions answered to patient satisfaction.

## 2022-07-13 NOTE — HISTORY OF PRESENT ILLNESS
[FreeTextEntry1] : JAMES ROGEL is a 66 year female who returns to the office today for postoperative follow up 3 weeks s/p L4-L5 laminectomy and fusion on 6/21/22. Her postoperative course was uncomplicated and she was discharged home with home health services on 6/25/22. Today she reports she is feeling well. Her pre-operative intractable back pain as significantly improved. She has been participating in home PT. She is no longer taking narcotic pain medication. The patient denies weakness, numbness, fevers, chills, or incisional swelling, erythema or drainage. \par \par 3/16/22: Ms. Rogel presents today for neurosurgical follow up. She is a 66 year-old female that was initially set to undergo L4-5 decompression and fusion secondary to intractable low back pain secondary to spondylolisthesis. 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 Stock. \par \par Today, she returns for preop surgical discussion. Her back pain with left gluteal radicular pain persists. She denies numbness/tingling, weakness or bowel/bladder dysfunction. \par \par 8/23/2021: Mr. Rogel presents today for follow up of Flex/ext Lumbar Xray and CT L spine from previous visit ~2 weeks ago regarding her long standing back pain. We recommended last time that she is a surgical candidate and would need further imaging for surgical planning. She had her Flex/ext L spine xray performed which showed no instability. She also had her CT L spine with full report listed below. \par \par 8/09/2021: 64 yo F with pmhx of Aortic stenosis, HLD, HTN, Depression, on baby ASA, referred by self, presents to neurosurgery clinic due to long standing back pain. She had this back pain for many years and has tried physical therapy and pain management with ESIs in the past with no relief of pain. She had a recent MRI L spine performed on 8/5/21 which showed multilevel multifactorial central canal stenosis most severe at L4-5 followed by L5-S1 and L3-4. She describes the back pain as constant throughout the day and is mostly axial in nature however it intermittently radiates down left buttocks to left lateral mid thigh. She denies numbness, weakness of extremities and urinary/fecal incontinence.

## 2022-07-25 ENCOUNTER — RX RENEWAL (OUTPATIENT)
Age: 67
End: 2022-07-25

## 2022-07-25 RX ORDER — GABAPENTIN 300 MG/1
300 CAPSULE ORAL TWICE DAILY
Qty: 60 | Refills: 0 | Status: ACTIVE | COMMUNITY
Start: 2022-06-29 | End: 1900-01-01

## 2022-08-31 ENCOUNTER — APPOINTMENT (OUTPATIENT)
Dept: NEUROSURGERY | Facility: CLINIC | Age: 67
End: 2022-08-31

## 2022-08-31 ENCOUNTER — RESULT REVIEW (OUTPATIENT)
Age: 67
End: 2022-08-31

## 2022-08-31 VITALS
HEART RATE: 80 BPM | SYSTOLIC BLOOD PRESSURE: 117 MMHG | HEIGHT: 65 IN | DIASTOLIC BLOOD PRESSURE: 73 MMHG | BODY MASS INDEX: 26.66 KG/M2 | WEIGHT: 160 LBS

## 2022-08-31 PROCEDURE — 99215 OFFICE O/P EST HI 40 MIN: CPT | Mod: 24

## 2022-08-31 NOTE — PHYSICAL EXAM
[General Appearance - Alert] : alert [General Appearance - In No Acute Distress] : in no acute distress [Longitudinal] : longitudinal [Clean] : clean [Well-Healed] : well-healed [FreeTextEntry1] : low back

## 2022-08-31 NOTE — HISTORY OF PRESENT ILLNESS
[FreeTextEntry1] : Ms. Rogel is s/p L4-5 lami/fusion on 6/25/22.  She continues to feel well and reports that her back pain has improved since her surgery.  She still has some muscle soreness around her incision. The patient denies weakness, numbness, fevers, chills, or incisional swelling, erythema or drainage.  She underwent AP/Lat XR as requested, which shows good hardware placement and improved lumbar alignment.\par \par 7/13/22: JAMES ROGEL is a 66 year female who returns to the office today for postoperative follow up 3 weeks s/p L4-L5 laminectomy and fusion on 6/21/22. Her postoperative course was uncomplicated and she was discharged home with home health services on 6/25/22. Today she reports she is feeling well. Her pre-operative intractable back pain as significantly improved. She has been participating in home PT. She is no longer taking narcotic pain medication. The patient denies weakness, numbness, fevers, chills, or incisional swelling, erythema or drainage. \par \par 3/16/22: Ms. Rogel presents today for neurosurgical follow up. She is a 66 year-old female that was initially set to undergo L4-5 decompression and fusion secondary to intractable low back pain secondary to spondylolisthesis. 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 Stock. Today, she returns for preop surgical discussion. Her back pain with left gluteal radicular pain persists. She denies numbness/tingling, weakness or bowel/bladder dysfunction. \par \par 8/23/2021: Mr. Rogel presents today for follow up of Flex/ext Lumbar Xray and CT L spine from previous visit ~2 weeks ago regarding her long standing back pain. We recommended last time that she is a surgical candidate and would need further imaging for surgical planning. She had her Flex/ext L spine xray performed which showed no instability. She also had her CT L spine with full report listed below. \par \par 8/09/2021: 64 yo F with pmhx of Aortic stenosis, HLD, HTN, Depression, on baby ASA, referred by self, presents to neurosurgery clinic due to long standing back pain. She had this back pain for many years and has tried physical therapy and pain management with ESIs in the past with no relief of pain. She had a recent MRI L spine performed on 8/5/21 which showed multilevel multifactorial central canal stenosis most severe at L4-5 followed by L5-S1 and L3-4. She describes the back pain as constant throughout the day and is mostly axial in nature however it intermittently radiates down left buttocks to left lateral mid thigh. She denies numbness, weakness of extremities and urinary/fecal incontinence.

## 2022-08-31 NOTE — ASSESSMENT
[FreeTextEntry1] : Ms. Richardson is doing well 2 months s/p L4-5 laminectomy and fusion for intractable back pain due to spondylolisthesis. Her incision is well-healed and her back pain is continuing to improve. She should continue outpatient physical therapy.  She should not lift greater than 10 lbs. She may return to work part-time in early September.  We will have her follow up with us in the office in 6 month's time with repeat AP/Lat XR prior to visit to further assess her progress. The patient understands the plan of care and is in agreement. All questions answered to patient satisfaction. \par

## 2023-01-30 ENCOUNTER — RESULT REVIEW (OUTPATIENT)
Age: 68
End: 2023-01-30

## 2023-01-30 ENCOUNTER — APPOINTMENT (OUTPATIENT)
Dept: NEUROSURGERY | Facility: CLINIC | Age: 68
End: 2023-01-30
Payer: MEDICARE

## 2023-01-30 VITALS
OXYGEN SATURATION: 98 % | WEIGHT: 158 LBS | BODY MASS INDEX: 26.33 KG/M2 | HEART RATE: 70 BPM | HEIGHT: 65 IN | DIASTOLIC BLOOD PRESSURE: 76 MMHG | SYSTOLIC BLOOD PRESSURE: 125 MMHG

## 2023-01-30 DIAGNOSIS — M43.16 SPONDYLOLISTHESIS, LUMBAR REGION: ICD-10-CM

## 2023-01-30 PROCEDURE — 99215 OFFICE O/P EST HI 40 MIN: CPT

## 2023-01-30 NOTE — PHYSICAL EXAM
[General Appearance - Alert] : alert [General Appearance - In No Acute Distress] : in no acute distress [General Appearance - Well Nourished] : well nourished [General Appearance - Well Developed] : well developed [Oriented To Time, Place, And Person] : oriented to person, place, and time [Person] : oriented to person [Place] : oriented to place [Time] : oriented to time [Cranial Nerves Optic (II)] : visual acuity intact bilaterally,  pupils equal round and reactive to light [Cranial Nerves Oculomotor (III)] : extraocular motion intact [Cranial Nerves Trigeminal (V)] : facial sensation intact symmetrically [Cranial Nerves Facial (VII)] : face symmetrical [Cranial Nerves Vestibulocochlear (VIII)] : hearing was intact bilaterally [Cranial Nerves Glossopharyngeal (IX)] : tongue and palate midline [Cranial Nerves Accessory (XI - Cranial And Spinal)] : head turning and shoulder shrug symmetric [Cranial Nerves Hypoglossal (XII)] : there was no tongue deviation with protrusion [Motor Strength] : muscle strength was normal in all four extremities [Sensation Tactile Decrease] : light touch was intact [Abnormal Walk] : normal gait [Balance] : balance was intact [Longitudinal] : longitudinal [Well-Healed] : well-healed [FreeTextEntry1] : low back

## 2023-01-30 NOTE — ASSESSMENT
[FreeTextEntry1] : Ms. Richardson is doing well 6 months s/p L4-5 laminectomy and fusion for intractable back pain due to spondylolisthesis. Her incision is well-healed and her back pain is continuing to improve. She should continue outpatient physical therapy for her right sided leg pain. She should not lift greater than 20 lbs.  We will have her follow up with us in the office in 6 months for a progress check, but we will touch base with her in 1-2 months by phone to follow up her new right sided radicular pain.  If symptoms persist, we will consider rpt MRI L spine and/or pain management evaluation at that time. The patient understands the plan of care and is in agreement. All questions answered to patient satisfaction. \marvin Ray PA-C

## 2023-01-30 NOTE — HISTORY OF PRESENT ILLNESS
[FreeTextEntry1] : Ms. Rogel presents today for neurosurgical follow up.  Previous notes and interval history reviewed.  She is s/p L4-5 laminectomy and posterolateral instrumented fusion in June 2022.  Her postoperative course was uncomplicated with improvement in her preoperative symptoms.  She presents today for routine follow up and longitudinal imaging review including lumbar spine xrays (see details below).  Today she reports new symptoms of right buttock pain into right lateral leg for 1 week, worse while lying down.  \par \par 8/31/22: Ms. Rogel is s/p L4-5 lami/fusion on 6/25/22. She continues to feel well and reports that her back pain has improved since her surgery. She still has some muscle soreness around her incision. The patient denies weakness, numbness, fevers, chills, or incisional swelling, erythema or drainage. She underwent AP/Lat XR as requested, which shows good hardware placement and improved lumbar alignment.\par \par 7/13/22: JAMES ROGEL is a 66 year female who returns to the office today for postoperative follow up 3 weeks s/p L4-L5 laminectomy and fusion on 6/21/22. Her postoperative course was uncomplicated and she was discharged home with home health services on 6/25/22. Today she reports she is feeling well. Her pre-operative intractable back pain as significantly improved. She has been participating in home PT. She is no longer taking narcotic pain medication. The patient denies weakness, numbness, fevers, chills, or incisional swelling, erythema or drainage. \par \par 3/16/22: Ms. Rogel presents today for neurosurgical follow up. She is a 66 year-old female that was initially set to undergo L4-5 decompression and fusion secondary to intractable low back pain secondary to spondylolisthesis. 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 Stock. Today, she returns for preop surgical discussion. Her back pain with left gluteal radicular pain persists. She denies numbness/tingling, weakness or bowel/bladder dysfunction. \par \par 8/23/2021: Mr. Rogel presents today for follow up of Flex/ext Lumbar Xray and CT L spine from previous visit ~2 weeks ago regarding her long standing back pain. We recommended last time that she is a surgical candidate and would need further imaging for surgical planning. She had her Flex/ext L spine xray performed which showed no instability. She also had her CT L spine with full report listed below. \par \par 8/09/2021: 66 yo F with pmhx of Aortic stenosis, HLD, HTN, Depression, on baby ASA, referred by self, presents to neurosurgery clinic due to long standing back pain. She had this back pain for many years and has tried physical therapy and pain management with ESIs in the past with no relief of pain. She had a recent MRI L spine performed on 8/5/21 which showed multilevel multifactorial central canal stenosis most severe at L4-5 followed by L5-S1 and L3-4. She describes the back pain as constant throughout the day and is mostly axial in nature however it intermittently radiates down left buttocks to left lateral mid thigh. She denies numbness, weakness of extremities and urinary/fecal incontinence.

## 2023-04-25 ENCOUNTER — RESULT REVIEW (OUTPATIENT)
Age: 68
End: 2023-04-25

## 2023-05-03 ENCOUNTER — APPOINTMENT (OUTPATIENT)
Dept: NEUROSURGERY | Facility: CLINIC | Age: 68
End: 2023-05-03
Payer: MEDICARE

## 2023-05-03 VITALS
HEART RATE: 82 BPM | WEIGHT: 158 LBS | DIASTOLIC BLOOD PRESSURE: 77 MMHG | BODY MASS INDEX: 26.33 KG/M2 | HEIGHT: 65 IN | OXYGEN SATURATION: 95 % | SYSTOLIC BLOOD PRESSURE: 123 MMHG

## 2023-05-03 DIAGNOSIS — M54.10 RADICULOPATHY, SITE UNSPECIFIED: ICD-10-CM

## 2023-05-03 PROCEDURE — 99215 OFFICE O/P EST HI 40 MIN: CPT

## 2023-05-03 NOTE — DATA REVIEWED
[de-identified] : \par  MRI Report             Final\par \par No Documents Attached\par \par \par \par \par   [facility] IMAGING\par \par                                         [address]\par                                      [place] [address]\par                                             [phone]\par \par PATIENT NAME:           JAMES ROGEL                      YOB: 1955\par ORDERING MD:           Michelle Aparicio MD\par \par PRIMARY CARE MD:           Allen Dunaway MD                      ATTENDING MD:           Russel Herrera\par Michelle Keane MD\par MEDICAL REC#:           [id number]                       ACCOUNT#:             [id number]\par LOCATION:             [facility]                            ORDER DATE/TIME:           04/26/23\par PROCEDURE:            MRI LUMBAR SPINE WAW IC\par \par ORDER #:              [id number]\par CC:                                         ;                     ;                     ;\par End of cc's\par \par CLINICAL INFORMATION: Low back pain\par \par  TECHNIQUE: Multiplanar, multisequence MRI was performed of the lumbar spine.\par  IV Contrast: Gadavist  7 cc administered   3 cc discarded\par \par  PRIOR STUDIES: 5/4/2022\par \par  FINDINGS:\par \par  Patient is status post posterior spinal fusion from L4 to L5 using paired\par  bilateral vertical rods secured by bilateral transpedicular screws. There is\par  no evidence for hardware complication. There are also laminectomy defects at\par  L4 and L5. There are associated postsurgical changes in the posterior\par  paraspinal soft tissues.\par \par  LOCALIZER: No additional findings.\par  BONES: There is no fracture or bone marrow edema.\par  ALIGNMENT: Unchanged grade 1 anterolisthesis at L4-L5, L5-S1 and mild\par  retrolisthesis of L2-L3 and L3-L4.\par  SACROILIAC JOINTS/SACRUM: There is no sacral fracture. The SI joints are\par  partially visualized but are intact.\par  CONUS AND CAUDA EQUINA: The distal cord and conus are normal in signal. Conus\par  terminates at L1.\par  VISUALIZED INTRAPELVIC/INTRA-ABDOMINAL SOFT TISSUES: Normal.\par  PARASPINAL SOFT TISSUES: Normal.\par \par \par  INDIVIDUAL LEVELS:\par \par  LOWER THORACIC SPINE: No spinal canal or neuroforaminal stenosis.\par \par  L1-L2: No spinal canal or neuroforaminal stenosis.\par  L2-L3: Mild retrolisthesis. Broad-based posterior disc bulge with superimposed\par  left intraforaminal protrusion in conjunction with moderate bilateral facet\par  arthropathy result in moderate left neural foraminal narrowing, mild right\par  neural foraminal narrowing and mild central canal narrowing.\par  L3-L4: Mild retrolisthesis. Status post posterior decompression. Broad-based\par  posterior disc bulge and moderate bilateral facet arthropathy result in\par  probable moderate to severe left neural foraminal narrowing, mild to moderate\par  right neural foraminal narrowing but no significant central canal narrowing.\par  L4-L5: Grade 1 anterolisthesis. Broad-based posterior disc bulge with\par  superimposed left intraforaminal protrusion in conjunction with moderate\par  bilateral facet arthropathy results in mild to moderate bilateral neural\par  foraminal narrowing but no significant central canal narrowing.\par  L5-S1: Grade 1 anterolisthesis. Broad-based posterior disc bulge and moderate\par  bilateral facet arthropathy result in moderate bilateral neural foraminal\par  narrowing and mild central canal narrowing.\par \par \par  IMPRESSION:\par \par  Posterior spinal fusion from L4 to L5 without visualized hardware\par  complication.\par \par  Multilevel discogenic degenerative disease and facet arthropathy of the lumbar\par  spine, most pronounced at L5-S1 where there is moderate bilateral neural\par  foraminal narrowing and mild central canal narrowing. Additional varying\par  degrees of central canal and neural foraminal narrowing, as above. Findings\par  are minimally progressed at L5-S1 as compared to 5/4/2022.\par \par  --- End of Report ---\par \par          Electronically Signed:\par          ____________________________________________\par          MELISSA MEJIA MD\par          04/26/23 1620\par \par  \par \par  Ordered by: MICHELLE APARICIO       Collected/Examined: 26Apr2023 12:00AM       \par Verification Required       Stage: Final       \par  Performed at: [facility]       Resulted: [id number] 04:20PM       Last Updated: 26Apr2023 04:25PM       Accession: [id number]

## 2023-05-03 NOTE — PHYSICAL EXAM
[FreeTextEntry1] : Constitutional: alert, in no acute distress, well nourished and well developed. \par Surgical Incision Surgical incision(s) located on the low back. Incision shape/type: longitudinal. The incision was well-healed. \par Psychiatric: oriented to person, place, and time. \par \par Orientation: oriented to person, oriented to place and oriented to time. \par Cranial Nerves: visual acuity intact bilaterally, pupils equal round and reactive to light, extraocular motion intact, facial sensation intact symmetrically, face symmetrical, hearing was intact bilaterally, tongue and palate midline, head turning and shoulder shrug symmetric and there was no tongue deviation with protrusion. \par Motor: muscle strength was normal in all four extremities. \par Sensory exam: light touch was intact. \par Coordination:. normal gait. balance was intact.

## 2023-05-03 NOTE — HISTORY OF PRESENT ILLNESS
[FreeTextEntry1] : Ms. Rogel presents today for neurosurgical follow up.  Previous notes and interval history reviewed.  She is s/p L4-5 laminectomy and posterolateral instrumented fusion in June 2022.  Initially reported improvement in preoperative symptoms.  More recently, has been experiencing right buttock and leg pain that is intermittent in nature but has persisted.  She recently underwent updated imaging in the form of MRI lumbar spine without contrast, detailed below.  \par \par 1/30/23: Ms. Rogel presents today for neurosurgical follow up. Previous notes and interval history reviewed. She is s/p L4-5 laminectomy and posterolateral instrumented fusion in June 2022. Her postoperative course was uncomplicated with improvement in her preoperative symptoms. She presents today for routine follow up and longitudinal imaging review including lumbar spine xrays (see details below). Today she reports new symptoms of right buttock pain into right lateral leg for 1 week, worse while lying down. \par \par 8/31/22: Ms. Rogel is s/p L4-5 lami/fusion on 6/25/22. She continues to feel well and reports that her back pain has improved since her surgery. She still has some muscle soreness around her incision. The patient denies weakness, numbness, fevers, chills, or incisional swelling, erythema or drainage. She underwent AP/Lat XR as requested, which shows good hardware placement and improved lumbar alignment.\par \par 7/13/22: JAMES ROGEL is a 66 year female who returns to the office today for postoperative follow up 3 weeks s/p L4-L5 laminectomy and fusion on 6/21/22. Her postoperative course was uncomplicated and she was discharged home with home health services on 6/25/22. Today she reports she is feeling well. Her pre-operative intractable back pain as significantly improved. She has been participating in home PT. She is no longer taking narcotic pain medication. The patient denies weakness, numbness, fevers, chills, or incisional swelling, erythema or drainage. \par \par 3/16/22: Ms. Rogel presents today for neurosurgical follow up. She is a 66 year-old female that was initially set to undergo L4-5 decompression and fusion secondary to intractable low back pain secondary to spondylolisthesis. 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 Stock. Today, she returns for preop surgical discussion. Her back pain with left gluteal radicular pain persists. She denies numbness/tingling, weakness or bowel/bladder dysfunction. \par \par 8/23/2021: Mr. Rogel presents today for follow up of Flex/ext Lumbar Xray and CT L spine from previous visit ~2 weeks ago regarding her long standing back pain. We recommended last time that she is a surgical candidate and would need further imaging for surgical planning. She had her Flex/ext L spine xray performed which showed no instability. She also had her CT L spine with full report listed below. \par \par 8/09/2021: 66 yo F with pmhx of Aortic stenosis, HLD, HTN, Depression, on baby ASA, referred by self, presents to neurosurgery clinic due to long standing back pain. She had this back pain for many years and has tried physical therapy and pain management with ESIs in the past with no relief of pain. She had a recent MRI L spine performed on 8/5/21 which showed multilevel multifactorial central canal stenosis most severe at L4-5 followed by L5-S1 and L3-4. She describes the back pain as constant throughout the day and is mostly axial in nature however it intermittently radiates down left buttocks to left lateral mid thigh. She denies numbness, weakness of extremities and urinary/fecal incontinence. \par

## 2023-05-03 NOTE — ASSESSMENT
[FreeTextEntry1] : Ms. Richardson is approximately 1 year s/p L4-5 laminectomy and fusion for intractable back pain due to spondylolisthesis.\par \par In the end I recommend pain management evaluation and physical therapy.  She should return in 3 months to assess her progress. \par \par The patient understands the plan of care and is in agreement. All questions answered to patient satisfaction.

## 2023-05-08 ENCOUNTER — APPOINTMENT (OUTPATIENT)
Dept: PAIN MANAGEMENT | Facility: CLINIC | Age: 68
End: 2023-05-08
Payer: MEDICARE

## 2023-05-08 VITALS
WEIGHT: 158 LBS | BODY MASS INDEX: 26.33 KG/M2 | DIASTOLIC BLOOD PRESSURE: 71 MMHG | HEIGHT: 65 IN | SYSTOLIC BLOOD PRESSURE: 117 MMHG

## 2023-05-08 DIAGNOSIS — M48.062 SPINAL STENOSIS, LUMBAR REGION WITH NEUROGENIC CLAUDICATION: ICD-10-CM

## 2023-05-08 PROCEDURE — 99204 OFFICE O/P NEW MOD 45 MIN: CPT

## 2023-05-08 NOTE — CONSULT LETTER
[Dear  ___] : Dear  [unfilled], [Consult Letter:] : I had the pleasure of evaluating your patient, [unfilled]. [Please see my note below.] : Please see my note below. [Consult Closing:] : Thank you very much for allowing me to participate in the care of this patient.  If you have any questions, please do not hesitate to contact me. [Sincerely,] : Sincerely, [FreeTextEntry3] : Wenceslao Barrow MD\par

## 2023-05-08 NOTE — ASSESSMENT
[FreeTextEntry1] : 67 yof s/p L4-L5 fusion presents w/ severe low back and right leg pain.\par \par I have personally reviewed the patient's MRI in detail and discussed it with them which is significant for new mild stenosis at L5-S1.\par \par The patient has failed to have relief with medication management. The patient has failed to have relief with more then six weeks of physical therapy within the last three months. Given the patients failure to improve with all other conservative measures, recommend L5-S1 interlaminar epidural steroid injection under fluoroscopic guidance. The patient will follow-up with me in my office two weeks following intervention.\par \par I have discussed in detail with the patient that an interventional spine procedure is associated with potential risks. The procedure may include an injection of steroid and potentially other medications (local anesthetic and normal saline) into the epidural space or surrounding tissue of the spine. There are significant risks of this procedure which include and are not limited to infection, bleeding, worsening pain, dural puncture leading to post-dural puncture headache, nerve damage, spinal cord injury, paralysis, stroke, and death. There is a chance that the procedure does not improve their pain. There are risks associated with the steroid being absorbed into the body systemically. These include dysphoria, difficulty sleeping, mood swings, and personality changes. Pre-menopausal women may notice a regularity his in her menstrual cycle for 2-3 months following the injection. Steroids can specifically affect patients with hypertension, diabetes, and peptic ulcers. The procedure may cause a temporary increase in blood pressure and blood glucose, and may adversely affect a peptic ulcer. Other, more rare complications, including avascular necrosis of the joints, glaucoma, and osteoporosis. I have discussed the risks of the procedure at length with the patient, and the potential benefits of pain relief. I have offered alternatives to the procedure. All questions were answered. The patient expressed understanding and wishes to proceed with the procedure.\par \par Physical therapy prescribed - goal will be to increase ROM, strengthening, postural training, other modalities ad baotol which may include massage and stim. Goals of therapy discussed with the patient in detail and will be discussed with physical therapist. Patient will follow-up following course of physical therapy to monitor progress and adjust therapy as needed.\par \par Acetaminophen 1,000 mg q8h prn for moderate pain. Risks, benefits, and alternatives of acetaminophen discussed with patient.\par \par Ibuprofen 600 mg q8h prn add when pain is not adequately controlled with acetaminophen. Risks, benefits, and alternatives of ibuprofen discussed with patient.\par \par Diet and nutritional strategies discussed which may improve patients pain and will improve overall health.\par

## 2023-05-08 NOTE — DATA REVIEWED
[FreeTextEntry1] : \par  MRI Report             Final\par \par No Documents Attached\par \par \par \par \par   [facility] IMAGING\par \par                                         [address]\par                                      [place] [address]\par                                             [phone]\par \par PATIENT NAME:           JAMES ROGEL                      YOB: 1955\par ORDERING MD:           Sanjeev Ocampo MD\par \par PRIMARY CARE MD:           Allen Dunaway MD                      ATTENDING MD:           Russel Herrera\par Sanjeev Keane MD\par MEDICAL REC#:           [id number]                       ACCOUNT#:             [id number]\par LOCATION:             [facility]                            ORDER DATE/TIME:           04/26/23\par PROCEDURE:            MRI LUMBAR SPINE WAW IC\par \par ORDER #:              [id number]\par CC:                                         ;                     ;                     ;\par End of cc's\par \par CLINICAL INFORMATION: Low back pain\par \par  TECHNIQUE: Multiplanar, multisequence MRI was performed of the lumbar spine.\par  IV Contrast: Gadavist  7 cc administered   3 cc discarded\par \par  PRIOR STUDIES: 5/4/2022\par \par  FINDINGS:\par \par  Patient is status post posterior spinal fusion from L4 to L5 using paired\par  bilateral vertical rods secured by bilateral transpedicular screws. There is\par  no evidence for hardware complication. There are also laminectomy defects at\par  L4 and L5. There are associated postsurgical changes in the posterior\par  paraspinal soft tissues.\par \par  LOCALIZER: No additional findings.\par  BONES: There is no fracture or bone marrow edema.\par  ALIGNMENT: Unchanged grade 1 anterolisthesis at L4-L5, L5-S1 and mild\par  retrolisthesis of L2-L3 and L3-L4.\par  SACROILIAC JOINTS/SACRUM: There is no sacral fracture. The SI joints are\par  partially visualized but are intact.\par  CONUS AND CAUDA EQUINA: The distal cord and conus are normal in signal. Conus\par  terminates at L1.\par  VISUALIZED INTRAPELVIC/INTRA-ABDOMINAL SOFT TISSUES: Normal.\par  PARASPINAL SOFT TISSUES: Normal.\par \par \par  INDIVIDUAL LEVELS:\par \par  LOWER THORACIC SPINE: No spinal canal or neuroforaminal stenosis.\par \par  L1-L2: No spinal canal or neuroforaminal stenosis.\par  L2-L3: Mild retrolisthesis. Broad-based posterior disc bulge with superimposed\par  left intraforaminal protrusion in conjunction with moderate bilateral facet\par  arthropathy result in moderate left neural foraminal narrowing, mild right\par  neural foraminal narrowing and mild central canal narrowing.\par  L3-L4: Mild retrolisthesis. Status post posterior decompression. Broad-based\par  posterior disc bulge and moderate bilateral facet arthropathy result in\par  probable moderate to severe left neural foraminal narrowing, mild to moderate\par  right neural foraminal narrowing but no significant central canal narrowing.\par  L4-L5: Grade 1 anterolisthesis. Broad-based posterior disc bulge with\par  superimposed left intraforaminal protrusion in conjunction with moderate\par  bilateral facet arthropathy results in mild to moderate bilateral neural\par  foraminal narrowing but no significant central canal narrowing.\par  L5-S1: Grade 1 anterolisthesis. Broad-based posterior disc bulge and moderate\par  bilateral facet arthropathy result in moderate bilateral neural foraminal\par  narrowing and mild central canal narrowing.\par \par \par  IMPRESSION:\par \par  Posterior spinal fusion from L4 to L5 without visualized hardware\par  complication.\par \par  Multilevel discogenic degenerative disease and facet arthropathy of the lumbar\par  spine, most pronounced at L5-S1 where there is moderate bilateral neural\par  foraminal narrowing and mild central canal narrowing. Additional varying\par  degrees of central canal and neural foraminal narrowing, as above. Findings\par  are minimally progressed at L5-S1 as compared to 5/4/2022.\par \par  --- End of Report ---\par \par          Electronically Signed:\par          ____________________________________________\par

## 2023-05-08 NOTE — REASON FOR VISIT
[Initial Consultation] : an initial pain management consultation [FreeTextEntry2] : Referred by Dr. Keane

## 2023-05-08 NOTE — PHYSICAL EXAM

## 2023-05-08 NOTE — HISTORY OF PRESENT ILLNESS
[4] : 3. What number best describes how, during the past week, pain has interfered with your general activity? 4/10 pain [Back Pain] : back pain [___ mths] : [unfilled] month(s) ago [Constant] : constant [3] : a minimum pain level of 3/10 [8] : a maximum pain level of 8/10 [Sharp] : sharp [Aching] : aching [Burning] : burning [Bending] : bending [Heat] : heat [Ice] : ice [Other: ___] : [unfilled] [FreeTextEntry1] : HPI: Ms. JAMES ROGEL is a 67 year F on baby ASA with pmhx of s/p L4-5 laminectomy and posterolateral instrumented fusion (June 2022), knee replacement, HTN, and aortic stenosis. Post lumbar surgery had initially reported improvement in preoperative symptom, but more recently has been experiencing right buttock and posterior leg pain. Saw Dr. Keane who ordered MRI and referred to pain management. Denies any additional weakness, numbness, bowel/bladder dysfunction. Quality of life is impaired. There has been a severe exacerbation of the patient's chronic pain.\par \par \par \par  [FreeTextEntry2] : 10 [FreeTextEntry7] : Lower back pain, right side, radiating down right leg [FreeTextEntry4] : advil

## 2023-05-15 ENCOUNTER — APPOINTMENT (OUTPATIENT)
Dept: PAIN MANAGEMENT | Facility: CLINIC | Age: 68
End: 2023-05-15
Payer: MEDICARE

## 2023-05-15 VITALS
HEIGHT: 65 IN | RESPIRATION RATE: 16 BRPM | WEIGHT: 157 LBS | HEART RATE: 62 BPM | OXYGEN SATURATION: 97 % | BODY MASS INDEX: 26.16 KG/M2 | SYSTOLIC BLOOD PRESSURE: 107 MMHG | DIASTOLIC BLOOD PRESSURE: 70 MMHG

## 2023-05-15 PROCEDURE — 64483 NJX AA&/STRD TFRM EPI L/S 1: CPT | Mod: RT

## 2023-05-15 NOTE — PROCEDURE
[FreeTextEntry1] : PROCEDURE: Right L5-S1 transforaminal epidural steroid injection under fluoroscopic guidance.\par \par CHIEF COMPLAINT: Low back and leg pain.  \par \par PREOPERATIVE DIAGNOSIS:	Lumbar radiculopathy.\par \par POSTOPERATIVE DIAGNOSIS:  Lumbar radiculopathy. 	\par \par ANESTHESIA:  Local.\par \par COMPLICATIONS:  	None.\par \par ESTIMATED BLOOD LOSS:  None.	\par \par INDICATIONS: Mrs. Richardson is a very pleasant 67 yof who has significant low back and leg pain.  The patient has failed to have relief with medication management and physical therapy. Given their failure to improve with all other conservative measures, it was determined that the patient would benefit from a transforaminal epidural steroid injection under fluoroscopic guidance.  \par \par The patient denies any fevers, chills, nausea, vomiting, diarrhea, constipation, chest pain, shortness of breath, or burning on urination.  They deny any latex allergy.  They are not taking any anticoagulants and do not have a history of coagulopathy.  The patient denies any IV contrast allergy.     \par \par PROCEDURE COURSE: The risks, benefits, and alternatives of the lumbar interlaminar epidural steroid injection were explained to the patient.  All questions were answered to their own satisfaction.  Written consent was signed and placed in the chart. The patient’s low back was marked in the preoperative holding area. \par \par The patient was brought to the Operating Room and placed in the prone position with a pillow under the abdomen to reduce lumbar lordosis.  A time-out was taken identifying the patient, the procedure, the site and side, and the patient’s allergies.  ASA standard monitors were applied for monitoring throughout the procedure.  The patient’s back was prepped and draped with Chloroprep in the usual sterile fashion and sterile technique was adhered to throughout the entire procedure.\par \par The lumbar spine was visualized under fluoroscopy in the AP view. The 12th rib and T12 vertebra were identified as a reference point and the lumbar vertebral bodies were counted.  The L5 vertebral body was then squared. The vertebral body and the superior and inferior end plates were aligned and the spinous processes were adjusted until midline. The fluoroscope was then obliqued approximately twenty degrees to the side of the target foramen and the L5-S1 foramen was identified under fluoroscopic guidance. The skin and subcutaneous tissues were infiltrated with 1% Lidocaine.  After adequate local anesthesia was obtained, a 22g needle was advanced toward the foramen. The needle was carefully advanced, alternating between AP and lateral views, to ensure appropriate trajectory and depth. The needle was advanced just under the pedicle and this was confirmed on lateral view. Once the foramen was accessed, negative aspiration for blood and CSF was obtained, further confirming location. Following this, 1 cc of contrast was administered and epidural spread was confirmed in the AP and lateral views. Following this, 10 mg of dexamethasone and 1 cc of 1% lidocaine was slowly administered in incremental amounts.\par \par All injections were done with negative aspiration for cerebrospinal fluid or heme, and all needles were withdrawn without incident. I personally met with the patient following the procedure and all questions were answered. The patient tolerated the procedure well without any apparent complications and was transferred to the Recovery Room in stable condition. The patient was provided with discharge instructions and will follow-up with me in my office.\par \par 	___________________________________\par 	Wenceslao Barrow M.D.\par \par

## 2023-06-05 ENCOUNTER — APPOINTMENT (OUTPATIENT)
Dept: PAIN MANAGEMENT | Facility: CLINIC | Age: 68
End: 2023-06-05

## 2023-07-20 ENCOUNTER — APPOINTMENT (OUTPATIENT)
Dept: PAIN MANAGEMENT | Facility: CLINIC | Age: 68
End: 2023-07-20
Payer: MEDICARE

## 2023-07-20 VITALS
RESPIRATION RATE: 18 BRPM | DIASTOLIC BLOOD PRESSURE: 75 MMHG | OXYGEN SATURATION: 96 % | HEART RATE: 73 BPM | BODY MASS INDEX: 26.49 KG/M2 | WEIGHT: 159 LBS | SYSTOLIC BLOOD PRESSURE: 140 MMHG | HEIGHT: 65 IN

## 2023-07-20 DIAGNOSIS — M48.061 SPINAL STENOSIS, LUMBAR REGION WITHOUT NEUROGENIC CLAUDICATION: ICD-10-CM

## 2023-07-20 DIAGNOSIS — M47.817 SPONDYLOSIS W/OUT MYELOPATHY OR RADICULOPATHY, LUMBOSACRAL REGION: ICD-10-CM

## 2023-07-20 DIAGNOSIS — M54.16 RADICULOPATHY, LUMBAR REGION: ICD-10-CM

## 2023-07-20 PROCEDURE — 99214 OFFICE O/P EST MOD 30 MIN: CPT

## 2023-07-20 RX ORDER — CYCLOBENZAPRINE HYDROCHLORIDE 10 MG/1
10 TABLET, FILM COATED ORAL
Qty: 30 | Refills: 0 | Status: ACTIVE | COMMUNITY
Start: 2023-07-20 | End: 1900-01-01

## 2023-07-20 NOTE — ASSESSMENT
[FreeTextEntry1] : 67 yof s/p L4-L5 fusion presents w/ severe low back and right leg pain which improved after SAVANAH but has had recent AVR and is on Plavix.\par \par I have personally reviewed the patient's MRI in detail and discussed it with them which is significant for new mild stenosis at L5-S1.\par \par No role for intervention at this time as she cannot stop Plavix.\par \par Cyclobenzaprine 10 mg QHS.\par \par Gabapentin prn.\par \par Physical therapy prescribed - goal will be to increase ROM, strengthening, postural training, other modalities ad batool which may include massage and stim. Goals of therapy discussed with the patient in detail and will be discussed with physical therapist. Patient will follow-up following course of physical therapy to monitor progress and adjust therapy as needed.\par \par Acetaminophen 1,000 mg q8h prn for moderate pain. Risks, benefits, and alternatives of acetaminophen discussed with patient.\par \par Ibuprofen 600 mg q8h prn add when pain is not adequately controlled with acetaminophen. Risks, benefits, and alternatives of ibuprofen discussed with patient.\par \par Diet and nutritional strategies discussed which may improve patients pain and will improve overall health.\par

## 2023-07-20 NOTE — PHYSICAL EXAM

## 2023-07-20 NOTE — HISTORY OF PRESENT ILLNESS
[Back Pain] : back pain [___ mths] : [unfilled] month(s) ago [Constant] : constant [3] : a minimum pain level of 3/10 [8] : a maximum pain level of 8/10 [Sharp] : sharp [Aching] : aching [Burning] : burning [Bending] : bending [Heat] : heat [Ice] : ice [Other: ___] : [unfilled] [4] : 3. What number best describes how, during the past week, pain has interfered with your general activity? 4/10 pain [FreeTextEntry1] : Interval History:\par Patient returns today. She reports that three days after her SAVANAH she lost significant amount of blood. She reports getting eight blood transfusions. She needed an AVR. Her leg does feel better since SAVANAH. Still has right-sided low back pain. Quality of life is impaired. There has been a severe exacerbation of the patient's chronic pain.\par \par HPI: Ms. JAMES ROGEL is a 67 year F on baby ASA with pmhx of s/p L4-5 laminectomy and posterolateral instrumented fusion (June 2022), knee replacement, HTN, and aortic stenosis. Post lumbar surgery had initially reported improvement in preoperative symptom, but more recently has been experiencing right buttock and posterior leg pain. Saw Dr. Keane who ordered MRI and referred to pain management. Denies any additional weakness, numbness, bowel/bladder dysfunction. Quality of life is impaired. There has been a severe exacerbation of the patient's chronic pain.\par \par Interventions:\par Right L5-S1 TFESI (05/15/23):\par \par \par \par  [FreeTextEntry7] : Lower back pain, right side, radiating down right leg [FreeTextEntry4] : advil [FreeTextEntry2] : 12

## 2023-08-17 NOTE — HISTORY OF PRESENT ILLNESS
[FreeTextEntry1] : Ms. Rogel is here for interval follow up s/p L4-5 laminectomy and posterolateral instrumented fusion in June 2022. Her right buttock and leg pain has ***.  She has seen Dr. Barrow who recommended SAVANAH, which ***.  She has completed a course of PT.    5/3/23: Ms. Rogel presents today for neurosurgical follow up.  Previous notes and interval history reviewed.  She is s/p L4-5 laminectomy and posterolateral instrumented fusion in June 2022.  Initially reported improvement in preoperative symptoms.  More recently, has been experiencing right buttock and leg pain that is intermittent in nature but has persisted.  She recently underwent updated imaging in the form of MRI lumbar spine without contrast, detailed below.    1/30/23: Ms. Rogel presents today for neurosurgical follow up. Previous notes and interval history reviewed. She is s/p L4-5 laminectomy and posterolateral instrumented fusion in June 2022. Her postoperative course was uncomplicated with improvement in her preoperative symptoms. She presents today for routine follow up and longitudinal imaging review including lumbar spine xrays (see details below). Today she reports new symptoms of right buttock pain into right lateral leg for 1 week, worse while lying down.   8/31/22: Ms. Rogel is s/p L4-5 lami/fusion on 6/25/22. She continues to feel well and reports that her back pain has improved since her surgery. She still has some muscle soreness around her incision. The patient denies weakness, numbness, fevers, chills, or incisional swelling, erythema or drainage. She underwent AP/Lat XR as requested, which shows good hardware placement and improved lumbar alignment.  7/13/22: JAMES ROGEL is a 66 year female who returns to the office today for postoperative follow up 3 weeks s/p L4-L5 laminectomy and fusion on 6/21/22. Her postoperative course was uncomplicated and she was discharged home with home health services on 6/25/22. Today she reports she is feeling well. Her pre-operative intractable back pain as significantly improved. She has been participating in home PT. She is no longer taking narcotic pain medication. The patient denies weakness, numbness, fevers, chills, or incisional swelling, erythema or drainage.   3/16/22: Ms. Rogel presents today for neurosurgical follow up. She is a 66 year-old female that was initially set to undergo L4-5 decompression and fusion secondary to intractable low back pain secondary to spondylolisthesis. 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 Stock. Today, she returns for preop surgical discussion. Her back pain with left gluteal radicular pain persists. She denies numbness/tingling, weakness or bowel/bladder dysfunction.   8/23/2021: Mr. Rogel presents today for follow up of Flex/ext Lumbar Xray and CT L spine from previous visit ~2 weeks ago regarding her long standing back pain. We recommended last time that she is a surgical candidate and would need further imaging for surgical planning. She had her Flex/ext L spine xray performed which showed no instability. She also had her CT L spine with full report listed below.   8/09/2021: 64 yo F with pmhx of Aortic stenosis, HLD, HTN, Depression, on baby ASA, referred by self, presents to neurosurgery clinic due to long standing back pain. She had this back pain for many years and has tried physical therapy and pain management with ESIs in the past with no relief of pain. She had a recent MRI L spine performed on 8/5/21 which showed multilevel multifactorial central canal stenosis most severe at L4-5 followed by L5-S1 and L3-4. She describes the back pain as constant throughout the day and is mostly axial in nature however it intermittently radiates down left buttocks to left lateral mid thigh. She denies numbness, weakness of extremities and urinary/fecal incontinence.

## 2023-08-17 NOTE — ASSESSMENT
[FreeTextEntry1] : Ms. Richardson is approximately 1 year s/p L4-5 laminectomy and fusion for intractable back pain due to spondylolisthesis.  In the end I recommend continued pain management and physical therapy.  She should return as needed at this point.   The patient understands the plan of care and is in agreement. All questions answered to patient satisfaction.

## 2023-08-17 NOTE — DATA REVIEWED
[de-identified] : \par   MRI Report             Final\par  \par  No Documents Attached\par  \par  \par  \par  \par    [facility]\par  \par                                          [address]\par                                       [place] [address]\par                                              [phone]\par  \par  PATIENT NAME:           JAMES ROGEL                      YOB: 1955\par  ORDERING MD:           Michelle Aparicio MD\par  \par  PRIMARY CARE MD:           Allen Dunaway MD                      ATTENDING MD:           Russel Herrera\par  Michelle Keane MD\par  MEDICAL REC#:           [id number]                       ACCOUNT#:             [id number]\par  LOCATION:             [facility]                            ORDER DATE/TIME:           04/26/23\par  PROCEDURE:            MRI LUMBAR SPINE WAW IC\par  \par  ORDER #:              [id number]\par  CC:                                         ;                     ;                     ;\par  End of cc's\par  \par  CLINICAL INFORMATION: Low back pain\par  \par   TECHNIQUE: Multiplanar, multisequence MRI was performed of the lumbar spine.\par   IV Contrast: Gadavist  7 cc administered   3 cc discarded\par  \par   PRIOR STUDIES: 5/4/2022\par  \par   FINDINGS:\par  \par   Patient is status post posterior spinal fusion from L4 to L5 using paired\par   bilateral vertical rods secured by bilateral transpedicular screws. There is\par   no evidence for hardware complication. There are also laminectomy defects at\par   L4 and L5. There are associated postsurgical changes in the posterior\par   paraspinal soft tissues.\par  \par   LOCALIZER: No additional findings.\par   BONES: There is no fracture or bone marrow edema.\par   ALIGNMENT: Unchanged grade 1 anterolisthesis at L4-L5, L5-S1 and mild\par   retrolisthesis of L2-L3 and L3-L4.\par   SACROILIAC JOINTS/SACRUM: There is no sacral fracture. The SI joints are\par   partially visualized but are intact.\par   CONUS AND CAUDA EQUINA: The distal cord and conus are normal in signal. Conus\par   terminates at L1.\par   VISUALIZED INTRAPELVIC/INTRA-ABDOMINAL SOFT TISSUES: Normal.\par   PARASPINAL SOFT TISSUES: Normal.\par  \par  \par   INDIVIDUAL LEVELS:\par  \par   LOWER THORACIC SPINE: No spinal canal or neuroforaminal stenosis.\par  \par   L1-L2: No spinal canal or neuroforaminal stenosis.\par   L2-L3: Mild retrolisthesis. Broad-based posterior disc bulge with superimposed\par   left intraforaminal protrusion in conjunction with moderate bilateral facet\par   arthropathy result in moderate left neural foraminal narrowing, mild right\par   neural foraminal narrowing and mild central canal narrowing.\par   L3-L4: Mild retrolisthesis. Status post posterior decompression. Broad-based\par   posterior disc bulge and moderate bilateral facet arthropathy result in\par   probable moderate to severe left neural foraminal narrowing, mild to moderate\par   right neural foraminal narrowing but no significant central canal narrowing.\par   L4-L5: Grade 1 anterolisthesis. Broad-based posterior disc bulge with\par   superimposed left intraforaminal protrusion in conjunction with moderate\par   bilateral facet arthropathy results in mild to moderate bilateral neural\par   foraminal narrowing but no significant central canal narrowing.\par   L5-S1: Grade 1 anterolisthesis. Broad-based posterior disc bulge and moderate\par   bilateral facet arthropathy result in moderate bilateral neural foraminal\par   narrowing and mild central canal narrowing.\par  \par  \par   IMPRESSION:\par  \par   Posterior spinal fusion from L4 to L5 without visualized hardware\par   complication.\par  \par   Multilevel discogenic degenerative disease and facet arthropathy of the lumbar\par   spine, most pronounced at L5-S1 where there is moderate bilateral neural\par   foraminal narrowing and mild central canal narrowing. Additional varying\par   degrees of central canal and neural foraminal narrowing, as above. Findings\par   are minimally progressed at L5-S1 as compared to 5/4/2022.\par  \par   --- End of Report ---\par  \par           Electronically Signed:\par           ____________________________________________\par           MELISSA MEJIA MD\par           04/26/23 1620\par  \par   \par  \par   Ordered by: MICHELLE APARICIO       Collected/Examined: 26Apr2023 12:00AM       \par  Verification Required       Stage: Final       \par   Performed at: [facility]       Resulted: [id number] 04:20PM       Last Updated: 26Apr2023 04:25PM       Accession: [id number]

## 2023-08-23 ENCOUNTER — APPOINTMENT (OUTPATIENT)
Dept: NEUROSURGERY | Facility: CLINIC | Age: 68
End: 2023-08-23

## 2024-10-14 ENCOUNTER — APPOINTMENT (OUTPATIENT)
Dept: PAIN MANAGEMENT | Facility: CLINIC | Age: 69
End: 2024-10-14
Payer: MEDICARE

## 2024-10-14 VITALS
HEIGHT: 65 IN | BODY MASS INDEX: 26.49 KG/M2 | SYSTOLIC BLOOD PRESSURE: 153 MMHG | WEIGHT: 159 LBS | DIASTOLIC BLOOD PRESSURE: 81 MMHG

## 2024-10-14 DIAGNOSIS — M48.061 SPINAL STENOSIS, LUMBAR REGION WITHOUT NEUROGENIC CLAUDICATION: ICD-10-CM

## 2024-10-14 DIAGNOSIS — M54.16 RADICULOPATHY, LUMBAR REGION: ICD-10-CM

## 2024-10-14 PROCEDURE — 99214 OFFICE O/P EST MOD 30 MIN: CPT

## 2024-11-07 ENCOUNTER — APPOINTMENT (OUTPATIENT)
Dept: PAIN MANAGEMENT | Facility: CLINIC | Age: 69
End: 2024-11-07
Payer: MEDICARE

## 2024-11-07 VITALS
OXYGEN SATURATION: 98 % | HEART RATE: 73 BPM | SYSTOLIC BLOOD PRESSURE: 165 MMHG | DIASTOLIC BLOOD PRESSURE: 94 MMHG | RESPIRATION RATE: 17 BRPM

## 2024-11-07 DIAGNOSIS — M54.16 RADICULOPATHY, LUMBAR REGION: ICD-10-CM

## 2024-11-07 PROCEDURE — 64483 NJX AA&/STRD TFRM EPI L/S 1: CPT | Mod: RT

## 2024-11-21 ENCOUNTER — APPOINTMENT (OUTPATIENT)
Dept: PAIN MANAGEMENT | Facility: CLINIC | Age: 69
End: 2024-11-21
Payer: MEDICARE

## 2024-11-21 VITALS
HEIGHT: 65 IN | SYSTOLIC BLOOD PRESSURE: 120 MMHG | BODY MASS INDEX: 26.49 KG/M2 | WEIGHT: 159 LBS | DIASTOLIC BLOOD PRESSURE: 80 MMHG

## 2024-11-21 DIAGNOSIS — M48.061 SPINAL STENOSIS, LUMBAR REGION WITHOUT NEUROGENIC CLAUDICATION: ICD-10-CM

## 2024-11-21 DIAGNOSIS — M47.817 SPONDYLOSIS W/OUT MYELOPATHY OR RADICULOPATHY, LUMBOSACRAL REGION: ICD-10-CM

## 2024-11-21 DIAGNOSIS — M54.16 RADICULOPATHY, LUMBAR REGION: ICD-10-CM

## 2024-11-21 PROCEDURE — 99214 OFFICE O/P EST MOD 30 MIN: CPT

## 2024-12-12 ENCOUNTER — APPOINTMENT (OUTPATIENT)
Dept: PAIN MANAGEMENT | Facility: CLINIC | Age: 69
End: 2024-12-12
Payer: MEDICARE

## 2024-12-12 VITALS
SYSTOLIC BLOOD PRESSURE: 136 MMHG | DIASTOLIC BLOOD PRESSURE: 80 MMHG | RESPIRATION RATE: 16 BRPM | HEART RATE: 74 BPM | OXYGEN SATURATION: 97 %

## 2024-12-12 PROCEDURE — 64494 INJ PARAVERT F JNT L/S 2 LEV: CPT | Mod: 50

## 2024-12-12 PROCEDURE — 64493 INJ PARAVERT F JNT L/S 1 LEV: CPT | Mod: 50

## 2024-12-18 ENCOUNTER — APPOINTMENT (OUTPATIENT)
Dept: PAIN MANAGEMENT | Facility: CLINIC | Age: 69
End: 2024-12-18
Payer: MEDICARE

## 2024-12-18 DIAGNOSIS — M54.10 RADICULOPATHY, SITE UNSPECIFIED: ICD-10-CM

## 2024-12-18 DIAGNOSIS — M47.817 SPONDYLOSIS W/OUT MYELOPATHY OR RADICULOPATHY, LUMBOSACRAL REGION: ICD-10-CM

## 2024-12-18 DIAGNOSIS — M54.16 RADICULOPATHY, LUMBAR REGION: ICD-10-CM

## 2024-12-18 PROCEDURE — G2211 COMPLEX E/M VISIT ADD ON: CPT

## 2024-12-18 PROCEDURE — 99214 OFFICE O/P EST MOD 30 MIN: CPT

## 2025-05-05 ENCOUNTER — APPOINTMENT (OUTPATIENT)
Dept: PAIN MANAGEMENT | Facility: CLINIC | Age: 70
End: 2025-05-05
Payer: MEDICARE

## 2025-05-05 VITALS
HEART RATE: 69 BPM | SYSTOLIC BLOOD PRESSURE: 141 MMHG | RESPIRATION RATE: 15 BRPM | OXYGEN SATURATION: 98 % | DIASTOLIC BLOOD PRESSURE: 76 MMHG

## 2025-05-05 PROCEDURE — 64493 INJ PARAVERT F JNT L/S 1 LEV: CPT | Mod: 50

## 2025-05-05 PROCEDURE — 64494 INJ PARAVERT F JNT L/S 2 LEV: CPT | Mod: 50

## 2025-05-07 ENCOUNTER — APPOINTMENT (OUTPATIENT)
Dept: PAIN MANAGEMENT | Facility: CLINIC | Age: 70
End: 2025-05-07
Payer: MEDICARE

## 2025-05-07 DIAGNOSIS — M47.817 SPONDYLOSIS W/OUT MYELOPATHY OR RADICULOPATHY, LUMBOSACRAL REGION: ICD-10-CM

## 2025-05-07 DIAGNOSIS — M54.16 RADICULOPATHY, LUMBAR REGION: ICD-10-CM

## 2025-05-07 DIAGNOSIS — M54.10 RADICULOPATHY, SITE UNSPECIFIED: ICD-10-CM

## 2025-05-07 DIAGNOSIS — M48.061 SPINAL STENOSIS, LUMBAR REGION WITHOUT NEUROGENIC CLAUDICATION: ICD-10-CM

## 2025-05-07 PROCEDURE — G2211 COMPLEX E/M VISIT ADD ON: CPT | Mod: 2W

## 2025-05-07 PROCEDURE — 99214 OFFICE O/P EST MOD 30 MIN: CPT | Mod: 2W

## 2025-06-03 ENCOUNTER — TRANSCRIPTION ENCOUNTER (OUTPATIENT)
Age: 70
End: 2025-06-03

## 2025-06-03 ENCOUNTER — APPOINTMENT (OUTPATIENT)
Dept: PAIN MANAGEMENT | Facility: HOSPITAL | Age: 70
End: 2025-06-03

## 2025-06-25 ENCOUNTER — APPOINTMENT (OUTPATIENT)
Dept: PAIN MANAGEMENT | Facility: CLINIC | Age: 70
End: 2025-06-25
Payer: MEDICARE

## 2025-06-25 PROCEDURE — G2211 COMPLEX E/M VISIT ADD ON: CPT

## 2025-06-25 PROCEDURE — 99214 OFFICE O/P EST MOD 30 MIN: CPT

## 2025-09-15 ENCOUNTER — APPOINTMENT (OUTPATIENT)
Dept: PAIN MANAGEMENT | Facility: CLINIC | Age: 70
End: 2025-09-15